# Patient Record
Sex: MALE | Race: WHITE | NOT HISPANIC OR LATINO | Employment: OTHER | URBAN - METROPOLITAN AREA
[De-identification: names, ages, dates, MRNs, and addresses within clinical notes are randomized per-mention and may not be internally consistent; named-entity substitution may affect disease eponyms.]

---

## 2023-04-20 ENCOUNTER — APPOINTMENT (OUTPATIENT)
Dept: PRIMARY CARE | Facility: CLINIC | Age: 68
End: 2023-04-20
Payer: MEDICARE

## 2023-04-23 DIAGNOSIS — E78.2 MIXED HYPERLIPIDEMIA: Primary | ICD-10-CM

## 2023-04-24 RX ORDER — SIMVASTATIN 20 MG/1
TABLET, FILM COATED ORAL
Qty: 90 TABLET | Refills: 0 | Status: SHIPPED | OUTPATIENT
Start: 2023-04-24 | End: 2023-07-17

## 2023-06-06 PROBLEM — R06.83 SNORING: Status: ACTIVE | Noted: 2023-06-06

## 2023-06-06 PROBLEM — R26.81 UNSTEADY GAIT: Status: ACTIVE | Noted: 2023-06-06

## 2023-06-06 PROBLEM — F43.21 SITUATIONAL DEPRESSION: Status: ACTIVE | Noted: 2023-06-06

## 2023-06-06 PROBLEM — N20.0 CALCIUM OXALATE STONES: Status: ACTIVE | Noted: 2023-06-06

## 2023-06-06 PROBLEM — H60.509 ACUTE OTITIS EXTERNA: Status: ACTIVE | Noted: 2023-06-06

## 2023-06-06 PROBLEM — J98.8 RESPIRATORY TRACT INFECTION DUE TO COVID-19 VIRUS: Status: ACTIVE | Noted: 2023-06-06

## 2023-06-06 PROBLEM — E66.3 OVERWEIGHT (BMI 25.0-29.9): Status: ACTIVE | Noted: 2023-06-06

## 2023-06-06 PROBLEM — H61.22 IMPACTED CERUMEN OF LEFT EAR: Status: ACTIVE | Noted: 2023-06-06

## 2023-06-06 PROBLEM — L21.9 SEBORRHEA: Status: ACTIVE | Noted: 2023-06-06

## 2023-06-06 PROBLEM — H90.3 ASYMMETRICAL SENSORINEURAL HEARING LOSS: Status: ACTIVE | Noted: 2023-06-06

## 2023-06-06 PROBLEM — G89.29 CHRONIC PAIN OF LEFT KNEE: Status: ACTIVE | Noted: 2023-06-06

## 2023-06-06 PROBLEM — D12.6 ADENOMATOUS POLYP OF COLON: Status: ACTIVE | Noted: 2023-06-06

## 2023-06-06 PROBLEM — I69.128: Status: ACTIVE | Noted: 2023-06-06

## 2023-06-06 PROBLEM — M62.838 MUSCLE SPASMS OF NECK: Status: ACTIVE | Noted: 2023-06-06

## 2023-06-06 PROBLEM — M72.2 PLANTAR FASCIITIS: Status: ACTIVE | Noted: 2023-06-06

## 2023-06-06 PROBLEM — E78.5 DYSLIPIDEMIA: Status: ACTIVE | Noted: 2023-06-06

## 2023-06-06 PROBLEM — I44.4 LAFB (LEFT ANTERIOR FASCICULAR BLOCK): Status: ACTIVE | Noted: 2023-06-06

## 2023-06-06 PROBLEM — H93.12 TINNITUS OF LEFT EAR: Status: ACTIVE | Noted: 2023-06-06

## 2023-06-06 PROBLEM — R03.0 BLOOD PRESSURE ELEVATED WITHOUT HISTORY OF HTN: Status: ACTIVE | Noted: 2023-06-06

## 2023-06-06 PROBLEM — N40.0 BENIGN ENLARGEMENT OF PROSTATE: Status: ACTIVE | Noted: 2023-06-06

## 2023-06-06 PROBLEM — L40.9 PSORIASIS OF SCALP: Status: ACTIVE | Noted: 2023-06-06

## 2023-06-06 PROBLEM — J31.0 RHINITIS: Status: ACTIVE | Noted: 2023-06-06

## 2023-06-06 PROBLEM — Z99.89 AMBULATES WITH CANE: Status: ACTIVE | Noted: 2023-06-06

## 2023-06-06 PROBLEM — M25.562 CHRONIC PAIN OF LEFT KNEE: Status: ACTIVE | Noted: 2023-06-06

## 2023-06-06 PROBLEM — N28.9 KIDNEY LESION: Status: ACTIVE | Noted: 2023-06-06

## 2023-06-06 PROBLEM — H60.12 CELLULITIS OF LEFT EAR: Status: ACTIVE | Noted: 2023-06-06

## 2023-06-06 PROBLEM — M47.812 CERVICAL SPINE ARTHRITIS: Status: ACTIVE | Noted: 2023-06-06

## 2023-06-06 PROBLEM — Z97.3 WEARS GLASSES: Status: ACTIVE | Noted: 2023-06-06

## 2023-06-06 PROBLEM — U07.1 RESPIRATORY TRACT INFECTION DUE TO COVID-19 VIRUS: Status: ACTIVE | Noted: 2023-06-06

## 2023-06-06 PROBLEM — F41.8 SITUATIONAL ANXIETY: Status: ACTIVE | Noted: 2023-06-06

## 2023-06-06 PROBLEM — E78.5 HYPERLIPIDEMIA: Status: ACTIVE | Noted: 2023-06-06

## 2023-06-06 PROBLEM — N48.1 BALANITIS: Status: ACTIVE | Noted: 2023-06-06

## 2023-06-06 PROBLEM — R94.31 LEFT AXIS DEVIATION: Status: ACTIVE | Noted: 2023-06-06

## 2023-06-06 RX ORDER — FLUTICASONE PROPIONATE 50 MCG
SPRAY, SUSPENSION (ML) NASAL
COMMUNITY
Start: 2018-12-21 | End: 2023-06-15 | Stop reason: ALTCHOICE

## 2023-06-06 RX ORDER — ESCITALOPRAM OXALATE 10 MG/1
1 TABLET ORAL DAILY
COMMUNITY
End: 2023-06-15 | Stop reason: ALTCHOICE

## 2023-06-06 RX ORDER — BROMFENAC SODIUM 0.7 MG/ML
SOLUTION/ DROPS OPHTHALMIC
COMMUNITY
Start: 2022-10-10 | End: 2023-06-15 | Stop reason: ALTCHOICE

## 2023-06-06 RX ORDER — MUPIROCIN 20 MG/G
OINTMENT TOPICAL
COMMUNITY
Start: 2019-05-10 | End: 2023-06-15 | Stop reason: ALTCHOICE

## 2023-06-06 RX ORDER — CLOBETASOL PROPIONATE 0.46 MG/ML
SOLUTION TOPICAL
COMMUNITY
Start: 2022-09-08

## 2023-06-06 RX ORDER — CLOBETASOL PROPIONATE 0.5 MG/G
CREAM TOPICAL
COMMUNITY
Start: 2022-09-08

## 2023-06-06 RX ORDER — ESCITALOPRAM OXALATE 5 MG/1
1 TABLET ORAL DAILY
COMMUNITY
Start: 2019-03-28 | End: 2023-06-15 | Stop reason: ALTCHOICE

## 2023-06-06 RX ORDER — PREDNISOLONE ACETATE 10 MG/ML
SUSPENSION/ DROPS OPHTHALMIC
COMMUNITY
Start: 2022-10-10 | End: 2023-06-15 | Stop reason: ALTCHOICE

## 2023-06-06 RX ORDER — AMLODIPINE BESYLATE 2.5 MG/1
TABLET ORAL
COMMUNITY
Start: 2023-02-18 | End: 2023-10-03

## 2023-06-15 ENCOUNTER — OFFICE VISIT (OUTPATIENT)
Dept: PRIMARY CARE | Facility: CLINIC | Age: 68
End: 2023-06-15
Payer: MEDICARE

## 2023-06-15 VITALS
WEIGHT: 202 LBS | HEART RATE: 47 BPM | HEIGHT: 73 IN | OXYGEN SATURATION: 97 % | SYSTOLIC BLOOD PRESSURE: 124 MMHG | RESPIRATION RATE: 16 BRPM | TEMPERATURE: 97.2 F | DIASTOLIC BLOOD PRESSURE: 78 MMHG | BODY MASS INDEX: 26.77 KG/M2

## 2023-06-15 DIAGNOSIS — I69.128 SPEECH AND LANGUAGE DEFICIT AS LATE EFFECT OF INTRACEREBRAL HEMORRHAGE: ICD-10-CM

## 2023-06-15 DIAGNOSIS — E66.3 OVERWEIGHT (BMI 25.0-29.9): ICD-10-CM

## 2023-06-15 DIAGNOSIS — R35.1 BENIGN PROSTATIC HYPERPLASIA WITH NOCTURIA: ICD-10-CM

## 2023-06-15 DIAGNOSIS — R22.1 NECK NODULE: ICD-10-CM

## 2023-06-15 DIAGNOSIS — N40.1 BENIGN PROSTATIC HYPERPLASIA WITH NOCTURIA: ICD-10-CM

## 2023-06-15 DIAGNOSIS — E78.5 DYSLIPIDEMIA, GOAL LDL BELOW 100: ICD-10-CM

## 2023-06-15 DIAGNOSIS — D12.6 ADENOMATOUS POLYP OF COLON, UNSPECIFIED PART OF COLON: ICD-10-CM

## 2023-06-15 DIAGNOSIS — L40.9 PSORIASIS OF SCALP: ICD-10-CM

## 2023-06-15 DIAGNOSIS — R09.89 PULSE IRREGULARITY: ICD-10-CM

## 2023-06-15 DIAGNOSIS — R03.0 BLOOD PRESSURE ELEVATED WITHOUT HISTORY OF HTN: ICD-10-CM

## 2023-06-15 DIAGNOSIS — Z71.85 IMMUNIZATION COUNSELING: Primary | ICD-10-CM

## 2023-06-15 PROBLEM — M72.2 PLANTAR FASCIITIS: Status: RESOLVED | Noted: 2023-06-06 | Resolved: 2023-06-15

## 2023-06-15 PROBLEM — U07.1 RESPIRATORY TRACT INFECTION DUE TO COVID-19 VIRUS: Status: RESOLVED | Noted: 2023-06-06 | Resolved: 2023-06-15

## 2023-06-15 PROBLEM — J98.8 RESPIRATORY TRACT INFECTION DUE TO COVID-19 VIRUS: Status: RESOLVED | Noted: 2023-06-06 | Resolved: 2023-06-15

## 2023-06-15 PROBLEM — N28.9 KIDNEY LESION: Status: RESOLVED | Noted: 2023-06-06 | Resolved: 2023-06-15

## 2023-06-15 PROBLEM — M62.838 MUSCLE SPASMS OF NECK: Status: RESOLVED | Noted: 2023-06-06 | Resolved: 2023-06-15

## 2023-06-15 PROCEDURE — 1159F MED LIST DOCD IN RCRD: CPT | Performed by: INTERNAL MEDICINE

## 2023-06-15 PROCEDURE — 93000 ELECTROCARDIOGRAM COMPLETE: CPT | Performed by: INTERNAL MEDICINE

## 2023-06-15 PROCEDURE — 99214 OFFICE O/P EST MOD 30 MIN: CPT | Performed by: INTERNAL MEDICINE

## 2023-06-15 PROCEDURE — 1036F TOBACCO NON-USER: CPT | Performed by: INTERNAL MEDICINE

## 2023-06-15 PROCEDURE — 1160F RVW MEDS BY RX/DR IN RCRD: CPT | Performed by: INTERNAL MEDICINE

## 2023-06-15 RX ORDER — KETOTIFEN FUMARATE 0.35 MG/ML
1 SOLUTION/ DROPS OPHTHALMIC 2 TIMES DAILY
COMMUNITY

## 2023-06-15 ASSESSMENT — LIFESTYLE VARIABLES: HOW OFTEN DO YOU HAVE A DRINK CONTAINING ALCOHOL: 2-3 TIMES A WEEK

## 2023-06-15 ASSESSMENT — PATIENT HEALTH QUESTIONNAIRE - PHQ9
SUM OF ALL RESPONSES TO PHQ9 QUESTIONS 1 AND 2: 0
2. FEELING DOWN, DEPRESSED OR HOPELESS: NOT AT ALL
1. LITTLE INTEREST OR PLEASURE IN DOING THINGS: NOT AT ALL

## 2023-06-15 NOTE — PROGRESS NOTES
"Subjective   Patient ID: Ernesto Anaya is a 68 y.o. male who presents for Follow-up (5 month follow up//Pt colonoscopy got rescheduled, he will have it done August).    Here for follow-up.  He feels well without illnesses or injuries.  His left cataract was done and he is pleased with the outcome.  His right cataract is on hold.    No exertional chest pain, palpitations, dizziness, orthopnea or pedal edema.    Still with balance issues, from his brain surgery 4 years ago.  It has not improved though it has not worsened.  No falls.         Review of Systems    Objective   /78   Pulse (!) 47   Temp 36.2 °C (97.2 °F)   Resp 16   Ht 1.854 m (6' 1\")   Wt 91.6 kg (202 lb)   SpO2 97%   BMI 26.65 kg/m²     Physical Exam  Constitutional:       Appearance: Normal appearance.   HENT:      Head: Normocephalic and atraumatic.      Right Ear: Tympanic membrane normal.      Nose: Nose normal.   Eyes:      General: No scleral icterus.     Extraocular Movements: Extraocular movements intact.      Conjunctiva/sclera: Conjunctivae normal.      Pupils: Pupils are equal, round, and reactive to light.   Cardiovascular:      Rate and Rhythm: Normal rate and regular rhythm.      Pulses: Normal pulses.      Heart sounds: Normal heart sounds. No murmur heard.  Pulmonary:      Effort: Pulmonary effort is normal. No respiratory distress.      Breath sounds: Normal breath sounds. No stridor. No wheezing.   Abdominal:      General: Abdomen is flat. Bowel sounds are normal. There is no distension.      Palpations: Abdomen is soft. There is no mass.      Tenderness: There is no abdominal tenderness. There is no guarding.   Musculoskeletal:         General: No swelling, tenderness or deformity. Normal range of motion.      Cervical back: Normal range of motion and neck supple. No tenderness.   Lymphadenopathy:      Cervical: No cervical adenopathy.   Skin:     General: Skin is warm and dry.      Findings: No lesion or rash. "   Neurological:      General: No focal deficit present.      Mental Status: He is alert and oriented to person, place, and time.      Cranial Nerves: No cranial nerve deficit.      Motor: No weakness.   Psychiatric:         Mood and Affect: Mood normal.         Behavior: Behavior normal.         Thought Content: Thought content normal.         Judgment: Judgment normal.         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    Bradycardia-no dizziness.  Unremarkable EKG and rhythm strip 6/23.  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 No headaches no fevers no nausea. Rehabilitation efforts continued for some time.. He saw Dr. Alejo in June '19. No scheduled f/u planned unless symptoms recur.            Doing remarkably well now over 4 years out. Main residual deficit continues to be his imbalance. He continues extreme caution walking especially on uneven ground such as the grass or beach     Swallowing issues- coughing while eating. He has never had a swallowing test completed s/p his craniotomes. He will f/u with radiology for barium  "swallow.             Eating \"fine\" now. He will continue caution swallowing with eating as well. He opted not to do the swallowing test                Borderline elevated blood pressure-improved on recheck. He has a home machine-he will bring it in to meet with our pharmacy team in 2 to 3 weeks to check the accuracy and follow this understanding ideally his blood pressure should be consistently under 130.               Tolerating low-dose amlodipine.  Blood pressure acceptable today.      Dyslipidemia / elevated weight - he'll cont. statin.  Goal LDL under 100.  Lipids rev'd. Encouraged more exercise and weight loss efforts.  Annual lipids ordered     Exercise routine-he's now walking outside.      Gait unsteadiness / falls -multifactorial since his brain surgery-legs stiffen w/ prolonged sitting, and imbalance continues;  He has balance exercises - encouraged him to do daily, and caution on stairs - one hand always on the banister, and caution walking, and limited beer- which worsens his gait        Situational depression/emotional much improved with Lexapro. He will continue. He is looking forward to the holidays, and week in [place] in late January   Overall he is doing well-- With Lexapro 5 mg daily     Rhinitis- more of an issue this winter-since returning home from surgery- Flonase suggested in the past. Encouraged with springtime approaching. Refilled.     Right shoulder stiffness - noted some mornings. Tylenol occas used. encouraged continue daily stretches     Cervical spasms/advanced C-spine arthritis noted on imaging-suspect underlying DJD. 2. Handout provided for him to do twice daily after heating pad for 20-30 minutes in the past. Still occas sore. He will continue to work with therapy     Left knee-occasionally sore. He will continue nonweightbearing. Caution with the stairs. Minimal DJD on x-rays late 2019     Plantar fasciitis- improved with better shoes. Encouraged stretching     Arthralgias- " encouraged heat and stretch daily and follow-up with any persistent symptoms        BPH/urology care/history of nephrolithiasis/renal lesion-he will see Dr. Mazariegos each November for hx of calcium oxalate stones. PSA 11/19 normal. Renal ultrasound completed November 2019 with Dr. Mazariegos. He will follow-up within this summer           PSA Normal November 2022.     Colon polyps - Colonoscopy care- will continue colonoscopy updated 12/19. Next in 3 years- 12/22- ordered.             Spring colonoscopy postponed- rescheduled for Aug '23        Left tinnitus- a bit better with cerumen removal. ENT appointment completed January 2019 with Dr. Frankel. Left ear still with a bit less hearing. Will consider hearing test at some point down the road     Left ear cellulitis- topical mupirocin suggested last time. improved.     Scalp psoriasis / Seborrhea/skin care-he will follow-up with his dermatologist. meds not that helpful. Dr. Aldridge / [facility] who just ordered clobetasol spray.          He follows w/ him annually-right knee patch and scalp  stable     Orthodontic care-he has finished his final implant following his braces and restorative work. He will continue dental care accordingly with his dentist. unfortunately had another implant in early 2018. No new concerns. No new dental concerns     Vision care / cataracts -mainly depth perception affecting his walking-he will use caution accordingly and follow up with ophthalmology at [facility]. He will follow up with ophthalmology accordingly.   Cataract concerns and vision an issue; encouraged him to set up eye appt at [facility]. Anticipates left cataract this year            Left cataract surgery completed early 12/22. Improvement in visual acuity. He is pleased with the results. Anticipates right cataract at some point down the road.    Tdap-will need updated in 2023. ordered        Flu shot provided each fall- updated 11/22     Shingrix series updated  2020     Covid series completed. Booster completed. Additional booster shot updated 11/22     Pneumovax updated- 6/22     Follow-up 6 months, sooner with concerns     Charting was completed using voice recognition technology and may include unintended errors.

## 2023-08-16 PROBLEM — R05.8 RECURRENT COUGH: Chronic | Status: ACTIVE | Noted: 2023-08-16

## 2023-08-16 PROBLEM — I10 ESSENTIAL HYPERTENSION WITH GOAL BLOOD PRESSURE LESS THAN 130/85: Status: ACTIVE | Noted: 2023-08-16

## 2023-08-16 PROBLEM — E78.5 HYPERLIPIDEMIA: Status: ACTIVE | Noted: 2023-08-16

## 2023-08-16 PROBLEM — M72.2 PLANTAR FASCIITIS: Status: ACTIVE | Noted: 2023-08-16

## 2023-08-16 PROBLEM — R79.89 TSH ELEVATION: Status: ACTIVE | Noted: 2023-08-16

## 2023-08-16 PROBLEM — L72.3 SEBACEOUS CYST: Status: ACTIVE | Noted: 2023-08-16

## 2023-08-16 RX ORDER — ASPIRIN 81 MG/1
81 TABLET ORAL DAILY
COMMUNITY
End: 2023-12-19 | Stop reason: WASHOUT

## 2023-08-16 RX ORDER — CALCIPOTRIENE, BETAMETHASONE DIPROPIONATE 50; .643 UG/G; MG/G
OINTMENT TOPICAL AS NEEDED
COMMUNITY

## 2023-08-16 RX ORDER — GLUCOSAM/CHONDRO/HERB 149/HYAL 750-100 MG
1 TABLET ORAL
COMMUNITY
End: 2023-12-19 | Stop reason: WASHOUT

## 2023-08-17 ENCOUNTER — HOSPITAL ENCOUNTER (OUTPATIENT)
Dept: DATA CONVERSION | Facility: HOSPITAL | Age: 68
End: 2023-08-17
Attending: COLON & RECTAL SURGERY | Admitting: COLON & RECTAL SURGERY
Payer: MEDICARE

## 2023-08-17 DIAGNOSIS — K63.5 POLYP OF COLON: ICD-10-CM

## 2023-08-17 DIAGNOSIS — K57.30 DIVERTICULOSIS OF LARGE INTESTINE WITHOUT PERFORATION OR ABSCESS WITHOUT BLEEDING: ICD-10-CM

## 2023-08-17 DIAGNOSIS — Z12.11 ENCOUNTER FOR SCREENING FOR MALIGNANT NEOPLASM OF COLON: ICD-10-CM

## 2023-08-17 DIAGNOSIS — D12.3 BENIGN NEOPLASM OF TRANSVERSE COLON: ICD-10-CM

## 2023-08-17 DIAGNOSIS — K62.1 RECTAL POLYP: ICD-10-CM

## 2023-08-17 DIAGNOSIS — Z86.010 PERSONAL HISTORY OF COLONIC POLYPS: ICD-10-CM

## 2023-08-25 LAB
COMPLETE PATHOLOGY REPORT: NORMAL
CONVERTED CLINICAL DIAGNOSIS-HISTORY: NORMAL
CONVERTED FINAL DIAGNOSIS: NORMAL
CONVERTED FINAL REPORT PDF LINK TO COPY AND PASTE: NORMAL
CONVERTED GROSS DESCRIPTION: NORMAL

## 2023-09-18 LAB
ALANINE AMINOTRANSFERASE (SGPT) (U/L) IN SER/PLAS: 23 U/L (ref 10–52)
ALBUMIN (G/DL) IN SER/PLAS: 4.3 G/DL (ref 3.4–5)
ALKALINE PHOSPHATASE (U/L) IN SER/PLAS: 67 U/L (ref 33–136)
ANION GAP IN SER/PLAS: 11 MMOL/L (ref 10–20)
ASPARTATE AMINOTRANSFERASE (SGOT) (U/L) IN SER/PLAS: 20 U/L (ref 9–39)
BASOPHILS (10*3/UL) IN BLOOD BY AUTOMATED COUNT: 0.04 X10E9/L (ref 0–0.1)
BASOPHILS/100 LEUKOCYTES IN BLOOD BY AUTOMATED COUNT: 0.8 % (ref 0–2)
BILIRUBIN TOTAL (MG/DL) IN SER/PLAS: 0.5 MG/DL (ref 0–1.2)
CALCIUM (MG/DL) IN SER/PLAS: 9.5 MG/DL (ref 8.6–10.3)
CARBON DIOXIDE, TOTAL (MMOL/L) IN SER/PLAS: 28 MMOL/L (ref 21–32)
CHLORIDE (MMOL/L) IN SER/PLAS: 105 MMOL/L (ref 98–107)
CREATININE (MG/DL) IN SER/PLAS: 1.12 MG/DL (ref 0.5–1.3)
EOSINOPHILS (10*3/UL) IN BLOOD BY AUTOMATED COUNT: 0.05 X10E9/L (ref 0–0.7)
EOSINOPHILS/100 LEUKOCYTES IN BLOOD BY AUTOMATED COUNT: 1 % (ref 0–6)
ERYTHROCYTE DISTRIBUTION WIDTH (RATIO) BY AUTOMATED COUNT: 14 % (ref 11.5–14.5)
ERYTHROCYTE MEAN CORPUSCULAR HEMOGLOBIN CONCENTRATION (G/DL) BY AUTOMATED: 32.8 G/DL (ref 32–36)
ERYTHROCYTE MEAN CORPUSCULAR VOLUME (FL) BY AUTOMATED COUNT: 89 FL (ref 80–100)
ERYTHROCYTES (10*6/UL) IN BLOOD BY AUTOMATED COUNT: 5.26 X10E12/L (ref 4.5–5.9)
GFR MALE: 71 ML/MIN/1.73M2
GLUCOSE (MG/DL) IN SER/PLAS: 98 MG/DL (ref 74–99)
HEMATOCRIT (%) IN BLOOD BY AUTOMATED COUNT: 46.9 % (ref 41–52)
HEMOGLOBIN (G/DL) IN BLOOD: 15.4 G/DL (ref 13.5–17.5)
IMMATURE GRANULOCYTES/100 LEUKOCYTES IN BLOOD BY AUTOMATED COUNT: 0.4 % (ref 0–0.9)
LEUKOCYTES (10*3/UL) IN BLOOD BY AUTOMATED COUNT: 5 X10E9/L (ref 4.4–11.3)
LYMPHOCYTES (10*3/UL) IN BLOOD BY AUTOMATED COUNT: 1.41 X10E9/L (ref 1.2–4.8)
LYMPHOCYTES/100 LEUKOCYTES IN BLOOD BY AUTOMATED COUNT: 28.3 % (ref 13–44)
MONOCYTES (10*3/UL) IN BLOOD BY AUTOMATED COUNT: 0.61 X10E9/L (ref 0.1–1)
MONOCYTES/100 LEUKOCYTES IN BLOOD BY AUTOMATED COUNT: 12.2 % (ref 2–10)
NEUTROPHILS (10*3/UL) IN BLOOD BY AUTOMATED COUNT: 2.86 X10E9/L (ref 1.2–7.7)
NEUTROPHILS/100 LEUKOCYTES IN BLOOD BY AUTOMATED COUNT: 57.3 % (ref 40–80)
NRBC (PER 100 WBCS) BY AUTOMATED COUNT: 0 /100 WBC (ref 0–0)
PLATELETS (10*3/UL) IN BLOOD AUTOMATED COUNT: 151 X10E9/L (ref 150–450)
POTASSIUM (MMOL/L) IN SER/PLAS: 4.4 MMOL/L (ref 3.5–5.3)
PROTEIN TOTAL: 7.3 G/DL (ref 6.4–8.2)
SODIUM (MMOL/L) IN SER/PLAS: 140 MMOL/L (ref 136–145)
UREA NITROGEN (MG/DL) IN SER/PLAS: 20 MG/DL (ref 6–23)

## 2023-09-27 ENCOUNTER — HOSPITAL ENCOUNTER (OUTPATIENT)
Dept: DATA CONVERSION | Facility: HOSPITAL | Age: 68
Discharge: HOME | End: 2023-09-27
Attending: SURGERY | Admitting: SURGERY
Payer: MEDICARE

## 2023-09-27 DIAGNOSIS — L72.3 SEBACEOUS CYST: ICD-10-CM

## 2023-09-29 VITALS
HEART RATE: 74 BPM | TEMPERATURE: 97.3 F | HEIGHT: 73 IN | DIASTOLIC BLOOD PRESSURE: 88 MMHG | WEIGHT: 193.56 LBS | RESPIRATION RATE: 16 BRPM | SYSTOLIC BLOOD PRESSURE: 149 MMHG | BODY MASS INDEX: 25.65 KG/M2

## 2023-09-30 NOTE — H&P
History & Physical Reviewed:   I have reviewed the History and Physical dated:  26-Sep-2023   History and Physical reviewed and relevant findings noted. Patient examined to review pertinent physical  findings.: No significant changes   Home Medications Reviewed: no changes noted   Allergies Reviewed: no changes noted       ERAS (Enhanced Recovery After Surgery):  ·  ERAS Patient: no     Consent:   COVID-19 Consent:  ·  COVID-19 Risk Consent Surgeon has reviewed key risks related to the risk of kassandra COVID-19 and if they contract COVID-19 what the risks are.       Electronic Signatures:  Pete Silva)  (Signed 27-Sep-2023 06:52)   Authored: History & Physical Reviewed, ERAS, Consent,  Note Completion      Last Updated: 27-Sep-2023 06:52 by Pete Silva)

## 2023-09-30 NOTE — H&P
History of Present Illness:   History Present Illness:  Reason for surgery: history of colon polyp   HPI:    67 y/o M with history of colon polyps here today for colonoscopy    Allergies:        Allergies:  ·  No Known Allergies :     Home Medication Review:   Home Medications Reviewed: yes     Impression/Procedure:   ·  Impression and Planned Procedure: colonoscopy       ERAS (Enhanced Recovery After Surgery):  ·  ERAS Patient: no       Vital Signs:  Temperature C: 36.3 degrees C   Temperature F: 97.3 degrees F   Heart Rate: 74 beats per minute   Respiratory Rate: 16 breath per minute   Blood Pressure Systolic: 149 mm/Hg   Blood Pressure Diastolic: 88 mm/Hg     Physical Exam by System:    Respiratory/Thorax: nonlabored breathing on room  air   Cardiovascular: regular rate and rhythm     Consent:   COVID-19 Consent:  ·  COVID-19 Risk Consent Surgeon has reviewed key risks related to the risk of kassandra COVID-19 and if they contract COVID-19 what the risks are.     Attestation:   Note Completion:  I am a:  Resident/Fellow   Attending Attestation I saw and evaluated the patient.  I personally obtained the key and critical portions of the history and physical exam or was physically present for key and  critical portions performed by the resident/fellow. I reviewed the resident/fellow?s documentation and discussed the patient with the resident/fellow.  I agree with the resident/fellow?s medical decision making as documented in the note.     I personally evaluated the patient on 17-Aug-2023         Electronic Signatures:  Doyle Pierce)  (Signed 18-Aug-2023 10:33)   Authored: Physical Exam, Note Completion   Co-Signer: History of Present Illness, Allergies, Home Medication Review, Impression/Procedure, ERAS, Physical Exam, Consent, Note Completion  Minh Horner (Resident))  (Signed 17-Aug-2023 11:15)   Authored: History of Present Illness, Allergies, Home  Medication Review, Impression/Procedure,  ERAS, Physical Exam, Consent, Note Completion      Last Updated: 18-Aug-2023 10:33 by Doyle Pierce)

## 2023-09-30 NOTE — H&P
History of Present Illness:   HPI:    KACIE YEE is a 68 year old Male who presents for elective excision of an infected sebaceous cyst at the posterior base of the neck        Past medical history/    Hypertension  Elevated lipids  Kidney stones    Status post craniotomy for benign disease    Psoriasis          Social/    Non-smoker        Family/    Noncontributory    Comorbidities:   Comorbidites:  ·  Comorbid Conditions hypertension            Allergies:  ·  No Known Allergies :     Medications Prior to Admission:   Admission Medication Reconciliation has not been completed for this patient.    Review of Systems:   Constitutional: NEGATIVE: Fever, Chills, Anorexia,  Weight Loss, Malaise     Eyes: NEGATIVE: Blurry Vision, Drainage, Diploplia,  Redness, Vision Loss/ Change     ENMT: NEGATIVE: Nasal Discharge, Nasal Congestion,  Ear Pain, Mouth Pain, Throat Pain     Respiratory: NEGATIVE: Dry Cough, Productive Cough,  Hemoptysis, Wheezing, Shortness of Breath     Cardiac: NEGATIVE: Chest Pain, Dyspnea on Exertion,  Orthopnea, Palpitations, Syncope     Gastrointestinal: NEGATIVE: Nausea, Vomiting, Diarrhea,  Constipation, Abdominal Pain     Genitourinary: NEGATIVE: Discharge, Dysuria, Flank  Pain, Frequency, Hematuria     Musculoskeletal: NEGATIVE: Decreased ROM, Pain, Swelling,  Stiffness, Weakness     Neurological: NEGATIVE: Dizziness, Confusion, Headache,  Seizures, Syncope     Psychiatric: NEGATIVE: Mood Changes, Anxiety, Hallucinations,  Sleep Changes, Suicidal Ideas     Skin: NEGATIVE: Mass, Pain, Pruritus, Rash, Ulcer       Objective:   Physical Exam by System:    Constitutional: Well developed, awake/alert/oriented  x3, no distress, alert and cooperative   Eyes: PERRL, EOMI, clear sclera   ENMT: mucous membranes moist, no apparent injury,  no lesions seen   Head/Neck: Neck supple, no apparent injury, thyroid  without mass or tenderness, No JVD, trachea midline, no bruits   Respiratory/Thorax: Patent  airways, CTAB, normal  breath sounds with good chest expansion, thorax symmetric   Cardiovascular: Regular, rate and rhythm, no murmurs,  2+ equal pulses of the extremities, normal S 1and S 2   Gastrointestinal: Nondistended, soft, non-tender,  no rebound tenderness or guarding, no masses palpable, no organomegaly, +BS, no bruits   Extremities: normal extremities, no cyanosis edema,  contusions or wounds, no clubbing   Neurological: alert and oriented x3, intact senses,  motor, response and reflexes, normal strength   Skin: Examination of the posterior midline at the  base of the neck notes a 3 cm soft tissue mass.  It is soft and nontender.  It is not erythematous.  It has a punctum seen in the central area     Assessment and Plan:   Assessment:    Assessment/    Sebaceous cyst at base of neck      Hypertension  Elevated lipids  Psoriasis  Kidney stones  Status post craniotomy for benign disease          Plan/    This to be excised in the operating room under general anesthesia.    Risk benefits indications for this reviewed with the patient.  The anticipated convalescence is reviewed.  Potential infections reviewed.  All questions were answered and consent is obtained      Electronic Signatures:  Pete Silva)  (Signed 26-Sep-2023 11:39)   Authored: History of Present Illness, Comorbidities,  Allergies, Medications Prior to Admission, Review of Systems, Objective, Assessment and Plan, Note Completion      Last Updated: 26-Sep-2023 11:39 by Pete Silva)

## 2023-10-01 NOTE — OP NOTE
Post Operative Note:     PreOp Diagnosis: Sebaceous cyst at base of neck   Post-Procedure Diagnosis: Same   Procedure: 1.  Excision of sebaceous cyst  2.  Intermediate closure of wound  3.   4.   5.   Surgeon: Ricardo   Resident/Fellow/Other Assistant:    Anesthesia: General   Estimated Blood Loss (mL): Minimal   Specimen: yes   Findings: 5.5 cm mass, 8.0 cm closure     Operative Report Dictated:  Dictation: not applicable - note contains Operative  Report   Operative Report:    Patient is a 68-year-old male with a intermittently infected sebaceous cyst at the base of the neck.  He presents now for excision.  The risk benefits indications  for this were reviewed with him.  All questions were answered and consent was obtained.    Patient was taken to the operating room.  General esthesia was obtained.  He was placed on the table in prone position with the neck flexed forward.  Posterior neck was not prepped and draped in a sterile fashion.  Timeout procedures were followed    Lesion been marked consistent with the Jako standards preoperatively    Elliptical incision was made around the lesion and dissection carried out lesion was elevated and dissected free using cautery and sharp dissection.  Again a large sebaceous cyst was removed.  Was removed from the field.    Was made hemostatic and closed with multiple layers with 3-0 Vicryl.  Skin was closed with interrupted 3-0 nylon    Size the specimen was 55 mm.  Length of the closed wound was 80 mm.    Specimen sent to pathology.    Patient tolerated the procedure well      Electronic Signatures:  Pete Silva)  (Signed 27-Sep-2023 08:08)   Authored: Post Operative Note, Note Completion      Last Updated: 27-Sep-2023 08:08 by Pete Silva)

## 2023-10-03 DIAGNOSIS — I10 ESSENTIAL HYPERTENSION WITH GOAL BLOOD PRESSURE LESS THAN 130/85: Primary | ICD-10-CM

## 2023-10-03 DIAGNOSIS — E78.2 MIXED HYPERLIPIDEMIA: ICD-10-CM

## 2023-10-03 RX ORDER — SIMVASTATIN 20 MG/1
20 TABLET, FILM COATED ORAL EVERY EVENING
Qty: 90 TABLET | Refills: 3 | Status: SHIPPED | OUTPATIENT
Start: 2023-10-03 | End: 2023-12-19

## 2023-10-03 RX ORDER — AMLODIPINE BESYLATE 2.5 MG/1
2.5 TABLET ORAL DAILY
Qty: 90 TABLET | Refills: 3 | Status: SHIPPED | OUTPATIENT
Start: 2023-10-03

## 2023-10-10 ENCOUNTER — OFFICE VISIT (OUTPATIENT)
Dept: SURGERY | Facility: CLINIC | Age: 68
End: 2023-10-10
Payer: MEDICARE

## 2023-10-10 VITALS — HEART RATE: 53 BPM | TEMPERATURE: 97.6 F | SYSTOLIC BLOOD PRESSURE: 137 MMHG | DIASTOLIC BLOOD PRESSURE: 82 MMHG

## 2023-10-10 DIAGNOSIS — L72.3 SEBACEOUS CYST: Primary | ICD-10-CM

## 2023-10-10 PROCEDURE — 1160F RVW MEDS BY RX/DR IN RCRD: CPT | Performed by: SURGERY

## 2023-10-10 PROCEDURE — 3079F DIAST BP 80-89 MM HG: CPT | Performed by: SURGERY

## 2023-10-10 PROCEDURE — 1159F MED LIST DOCD IN RCRD: CPT | Performed by: SURGERY

## 2023-10-10 PROCEDURE — 1036F TOBACCO NON-USER: CPT | Performed by: SURGERY

## 2023-10-10 PROCEDURE — 99024 POSTOP FOLLOW-UP VISIT: CPT | Performed by: SURGERY

## 2023-10-10 PROCEDURE — 3075F SYST BP GE 130 - 139MM HG: CPT | Performed by: SURGERY

## 2023-10-10 NOTE — PROGRESS NOTES
Ernesto Anaya   [id number]   1955     Chief Complaint/        Postop        HPI/    Status post excision of a large cyst at the base of the neck    Patient ambulating easily.  Minimal pain.  States there is little bit of drainage        Final pathology demonstrates a large sebaceous cyst     Electrocardiogram 12 Lead  Sinus rhythm with frequent Premature ventricular complexes  Left anterior fascicular block  Cannot rule out Anterior infarct , age undetermined  ST & T wave abnormality, consider lateral ischemia  Abnormal ECG  When compared with ECG of 10-MARKO-2019 10:15,  Premature ventricular complexes are now Present  Nonspecific T wave abnormality no longer evident in Inferior leads  T wave inversion now evident in Lateral leads  Confirmed by Yanique Delcid ([id number]) on 9/21/2023 4:06:18 PM       /82 (BP Location: Right arm, Patient Position: Sitting, BP Cuff Size: Adult)   Pulse 53   Temp 36.4 °C (97.6 °F) (Temporal)      Physical Exam  Skin:     Comments: Posterior neck wound is little puffy.  No erythema.    The scabbing at the left lateral edge is noted.  Scab was unroofed and a seroma was evacuated.  This is left open and packed with gauze                Assessment/      Seroma    Plan/      This will need to be left open.  Change packing twice daily.    RTC 1 week

## 2023-10-17 ENCOUNTER — OFFICE VISIT (OUTPATIENT)
Dept: SURGERY | Facility: CLINIC | Age: 68
End: 2023-10-17
Payer: MEDICARE

## 2023-10-17 VITALS
OXYGEN SATURATION: 96 % | WEIGHT: 199.2 LBS | BODY MASS INDEX: 26.4 KG/M2 | DIASTOLIC BLOOD PRESSURE: 81 MMHG | SYSTOLIC BLOOD PRESSURE: 122 MMHG | HEIGHT: 73 IN | HEART RATE: 55 BPM | RESPIRATION RATE: 16 BRPM | TEMPERATURE: 98.2 F

## 2023-10-17 DIAGNOSIS — L72.3 SEBACEOUS CYST: Primary | ICD-10-CM

## 2023-10-17 PROCEDURE — 99212 OFFICE O/P EST SF 10 MIN: CPT | Performed by: SURGERY

## 2023-10-17 PROCEDURE — 1160F RVW MEDS BY RX/DR IN RCRD: CPT | Performed by: SURGERY

## 2023-10-17 PROCEDURE — 1036F TOBACCO NON-USER: CPT | Performed by: SURGERY

## 2023-10-17 PROCEDURE — 3079F DIAST BP 80-89 MM HG: CPT | Performed by: SURGERY

## 2023-10-17 PROCEDURE — 3074F SYST BP LT 130 MM HG: CPT | Performed by: SURGERY

## 2023-10-17 PROCEDURE — 1159F MED LIST DOCD IN RCRD: CPT | Performed by: SURGERY

## 2023-10-17 NOTE — PROGRESS NOTES
Chief complaint/    Wound check        HPI/    Status post excision of a large sebaceous cyst.  The wound developed a seroma and this was opened.  The patient states the wound is open even more        Physical exam/    The wound is open.  There are some granulation tissue is basically clean    No peripheral erythema or induration        Assessment/wound infection        Plan/    Complete the course oral antibiotics    Twice daily dressing changes    Wound will take several weeks to heal    RTC 2-3 weeks

## 2023-11-07 ENCOUNTER — APPOINTMENT (OUTPATIENT)
Dept: SURGERY | Facility: CLINIC | Age: 68
End: 2023-11-07
Payer: MEDICARE

## 2023-11-21 ENCOUNTER — LAB (OUTPATIENT)
Dept: LAB | Facility: LAB | Age: 68
End: 2023-11-21
Payer: MEDICARE

## 2023-11-21 DIAGNOSIS — R35.1 BENIGN PROSTATIC HYPERPLASIA WITH NOCTURIA: ICD-10-CM

## 2023-11-21 DIAGNOSIS — N40.1 BENIGN PROSTATIC HYPERPLASIA WITH NOCTURIA: ICD-10-CM

## 2023-11-21 DIAGNOSIS — R03.0 BLOOD PRESSURE ELEVATED WITHOUT HISTORY OF HTN: ICD-10-CM

## 2023-11-21 DIAGNOSIS — E78.5 DYSLIPIDEMIA, GOAL LDL BELOW 100: ICD-10-CM

## 2023-11-21 LAB
ALBUMIN SERPL BCP-MCNC: 4.2 G/DL (ref 3.4–5)
ALP SERPL-CCNC: 77 U/L (ref 33–136)
ALT SERPL W P-5'-P-CCNC: 26 U/L (ref 10–52)
ANION GAP SERPL CALC-SCNC: 13 MMOL/L (ref 10–20)
AST SERPL W P-5'-P-CCNC: 23 U/L (ref 9–39)
BILIRUB SERPL-MCNC: 0.5 MG/DL (ref 0–1.2)
BUN SERPL-MCNC: 19 MG/DL (ref 6–23)
CALCIUM SERPL-MCNC: 9.4 MG/DL (ref 8.6–10.3)
CHLORIDE SERPL-SCNC: 103 MMOL/L (ref 98–107)
CHOLEST SERPL-MCNC: 190 MG/DL (ref 0–199)
CHOLESTEROL/HDL RATIO: 4.7
CO2 SERPL-SCNC: 29 MMOL/L (ref 21–32)
CREAT SERPL-MCNC: 1.12 MG/DL (ref 0.5–1.3)
ERYTHROCYTE [DISTWIDTH] IN BLOOD BY AUTOMATED COUNT: 13.9 % (ref 11.5–14.5)
GFR SERPL CREATININE-BSD FRML MDRD: 71 ML/MIN/1.73M*2
GLUCOSE SERPL-MCNC: 94 MG/DL (ref 74–99)
HCT VFR BLD AUTO: 45.3 % (ref 41–52)
HDLC SERPL-MCNC: 40.7 MG/DL
HGB BLD-MCNC: 15 G/DL (ref 13.5–17.5)
LDLC SERPL CALC-MCNC: 105 MG/DL
MCH RBC QN AUTO: 29.7 PG (ref 26–34)
MCHC RBC AUTO-ENTMCNC: 33.1 G/DL (ref 32–36)
MCV RBC AUTO: 90 FL (ref 80–100)
NON HDL CHOLESTEROL: 149 MG/DL (ref 0–149)
NRBC BLD-RTO: 0 /100 WBCS (ref 0–0)
PLATELET # BLD AUTO: 180 X10*3/UL (ref 150–450)
POTASSIUM SERPL-SCNC: 4.4 MMOL/L (ref 3.5–5.3)
PROT SERPL-MCNC: 7.2 G/DL (ref 6.4–8.2)
PSA SERPL-MCNC: 0.38 NG/ML
RBC # BLD AUTO: 5.05 X10*6/UL (ref 4.5–5.9)
SODIUM SERPL-SCNC: 141 MMOL/L (ref 136–145)
TRIGL SERPL-MCNC: 220 MG/DL (ref 0–149)
TSH SERPL-ACNC: 3.12 MIU/L (ref 0.44–3.98)
VLDL: 44 MG/DL (ref 0–40)
WBC # BLD AUTO: 4.7 X10*3/UL (ref 4.4–11.3)

## 2023-11-21 PROCEDURE — 80061 LIPID PANEL: CPT

## 2023-11-21 PROCEDURE — 85027 COMPLETE CBC AUTOMATED: CPT

## 2023-11-21 PROCEDURE — 80053 COMPREHEN METABOLIC PANEL: CPT

## 2023-11-21 PROCEDURE — 84153 ASSAY OF PSA TOTAL: CPT

## 2023-11-21 PROCEDURE — 84443 ASSAY THYROID STIM HORMONE: CPT

## 2023-11-21 PROCEDURE — 36415 COLL VENOUS BLD VENIPUNCTURE: CPT

## 2023-12-19 ENCOUNTER — OFFICE VISIT (OUTPATIENT)
Dept: PRIMARY CARE | Facility: CLINIC | Age: 68
End: 2023-12-19
Payer: MEDICARE

## 2023-12-19 VITALS
DIASTOLIC BLOOD PRESSURE: 78 MMHG | BODY MASS INDEX: 26.32 KG/M2 | OXYGEN SATURATION: 97 % | HEIGHT: 73 IN | SYSTOLIC BLOOD PRESSURE: 126 MMHG | TEMPERATURE: 97.3 F | RESPIRATION RATE: 16 BRPM | HEART RATE: 65 BPM | WEIGHT: 198.6 LBS

## 2023-12-19 DIAGNOSIS — Z23 NEED FOR INFLUENZA VACCINATION: ICD-10-CM

## 2023-12-19 DIAGNOSIS — Z00.00 ROUTINE GENERAL MEDICAL EXAMINATION AT HEALTH CARE FACILITY: Primary | ICD-10-CM

## 2023-12-19 DIAGNOSIS — D12.6 TUBULAR ADENOMA OF COLON: Chronic | ICD-10-CM

## 2023-12-19 DIAGNOSIS — Z23 IMMUNIZATION DUE: ICD-10-CM

## 2023-12-19 DIAGNOSIS — I10 ESSENTIAL HYPERTENSION WITH GOAL BLOOD PRESSURE LESS THAN 130/85: Chronic | ICD-10-CM

## 2023-12-19 DIAGNOSIS — E78.5 DYSLIPIDEMIA, GOAL LDL BELOW 100: Chronic | ICD-10-CM

## 2023-12-19 PROBLEM — R03.0 BLOOD PRESSURE ELEVATED WITHOUT HISTORY OF HTN: Status: RESOLVED | Noted: 2023-06-06 | Resolved: 2023-12-19

## 2023-12-19 PROCEDURE — 3078F DIAST BP <80 MM HG: CPT | Performed by: INTERNAL MEDICINE

## 2023-12-19 PROCEDURE — G0439 PPPS, SUBSEQ VISIT: HCPCS | Performed by: INTERNAL MEDICINE

## 2023-12-19 PROCEDURE — G0444 DEPRESSION SCREEN ANNUAL: HCPCS | Performed by: INTERNAL MEDICINE

## 2023-12-19 PROCEDURE — G0008 ADMIN INFLUENZA VIRUS VAC: HCPCS | Performed by: INTERNAL MEDICINE

## 2023-12-19 PROCEDURE — 1170F FXNL STATUS ASSESSED: CPT | Performed by: INTERNAL MEDICINE

## 2023-12-19 PROCEDURE — 3074F SYST BP LT 130 MM HG: CPT | Performed by: INTERNAL MEDICINE

## 2023-12-19 PROCEDURE — 99214 OFFICE O/P EST MOD 30 MIN: CPT | Performed by: INTERNAL MEDICINE

## 2023-12-19 PROCEDURE — 1036F TOBACCO NON-USER: CPT | Performed by: INTERNAL MEDICINE

## 2023-12-19 PROCEDURE — 1159F MED LIST DOCD IN RCRD: CPT | Performed by: INTERNAL MEDICINE

## 2023-12-19 PROCEDURE — 90662 IIV NO PRSV INCREASED AG IM: CPT | Performed by: INTERNAL MEDICINE

## 2023-12-19 PROCEDURE — 1160F RVW MEDS BY RX/DR IN RCRD: CPT | Performed by: INTERNAL MEDICINE

## 2023-12-19 RX ORDER — ATORVASTATIN CALCIUM 20 MG/1
20 TABLET, FILM COATED ORAL DAILY
Qty: 90 TABLET | Refills: 3 | Status: SHIPPED | OUTPATIENT
Start: 2023-12-19 | End: 2024-12-13

## 2023-12-19 ASSESSMENT — ACTIVITIES OF DAILY LIVING (ADL)
FEEDING YOURSELF: INDEPENDENT
GROOMING: INDEPENDENT
ASSISTIVE_DEVICE: EYEGLASSES
BATHING: INDEPENDENT
GROCERY_SHOPPING: INDEPENDENT
MANAGING_FINANCES: INDEPENDENT
DRESSING: INDEPENDENT
JUDGMENT_ADEQUATE_SAFELY_COMPLETE_DAILY_ACTIVITIES: YES
DOING_HOUSEWORK: INDEPENDENT
TAKING_MEDICATION: INDEPENDENT
ADEQUATE_TO_COMPLETE_ADL: YES
PATIENT'S MEMORY ADEQUATE TO SAFELY COMPLETE DAILY ACTIVITIES?: YES
TOILETING: INDEPENDENT

## 2023-12-19 ASSESSMENT — PATIENT HEALTH QUESTIONNAIRE - PHQ9
2. FEELING DOWN, DEPRESSED OR HOPELESS: NOT AT ALL
SUM OF ALL RESPONSES TO PHQ9 QUESTIONS 1 AND 2: 0
SUM OF ALL RESPONSES TO PHQ9 QUESTIONS 1 AND 2: 0
2. FEELING DOWN, DEPRESSED OR HOPELESS: NOT AT ALL
1. LITTLE INTEREST OR PLEASURE IN DOING THINGS: NOT AT ALL
1. LITTLE INTEREST OR PLEASURE IN DOING THINGS: NOT AT ALL

## 2023-12-19 ASSESSMENT — ENCOUNTER SYMPTOMS
LOSS OF SENSATION IN FEET: 0
DEPRESSION: 0
OCCASIONAL FEELINGS OF UNSTEADINESS: 0

## 2023-12-19 NOTE — PROGRESS NOTES
"Subjective   Reason for Visit: Ernesto Anaya is an 68 y.o. male here for a Medicare Wellness visit.     Past Medical, Surgical, and Family History reviewed and updated in chart.         Here for follow-up and wellness visit  Doing well-looking forward to going to [place] this winter as he has done in the past  Remains active though not really exercising regularly at the [facility] center  No exertional chest pain, palpitations, dizziness, orthopnea or pedal edema.        Patient Care Team:  Doc Hernandez MD as PCP - General  Doc Hernandez MD as PCP - [facility] Attributed Provider     Review of Systems    Objective   Vitals:  /78 (BP Location: Left arm, Patient Position: Sitting, BP Cuff Size: Adult)   Pulse 65   Temp 36.3 °C (97.3 °F)   Resp 16   Ht 1.854 m (6' 1\")   Wt 90.1 kg (198 lb 9.6 oz)   SpO2 97%   BMI 26.20 kg/m²       Physical Exam  Vitals reviewed.   Constitutional:       Appearance: Normal appearance.      Comments: Very pleasant gentleman talked slightly slower-his baseline   HENT:      Head: Normocephalic and atraumatic.   Eyes:      General: No scleral icterus.        Right eye: No discharge.         Left eye: No discharge.      Extraocular Movements: Extraocular movements intact.      Conjunctiva/sclera: Conjunctivae normal.      Pupils: Pupils are equal, round, and reactive to light.   Cardiovascular:      Rate and Rhythm: Normal rate and regular rhythm.      Pulses: Normal pulses.      Heart sounds: Normal heart sounds. No murmur heard.  Pulmonary:      Effort: Pulmonary effort is normal.      Breath sounds: Normal breath sounds. No wheezing or rhonchi.   Musculoskeletal:         General: No deformity or signs of injury. Normal range of motion.      Cervical back: Normal range of motion and neck supple. No rigidity or tenderness.   Lymphadenopathy:      Cervical: No cervical adenopathy.   Skin:     General: Skin is warm and dry.      Findings: No rash.   Neurological:      General: No focal " deficit present.      Mental Status: He is alert and oriented to person, place, and time. Mental status is at baseline.      Cranial Nerves: No cranial nerve deficit.      Sensory: No sensory deficit.      Gait: Gait normal.   Psychiatric:         Mood and Affect: Mood normal.         Behavior: Behavior normal.         Thought Content: Thought content normal.         Judgment: Judgment normal.         Assessment/Plan   Problem List Items Addressed This Visit       Tubular adenoma of colon    Essential hypertension with goal blood pressure less than 130/85    Dyslipidemia, goal LDL below 100    Relevant Medications    atorvastatin (Lipitor) 20 mg tablet    Other Relevant Orders    Lipid Panel    Alanine Aminotransferase    Immunization due     Other Visit Diagnoses       Routine general medical examination at health care facility    -  Primary    Relevant Orders    1 Year [facility] Up In [facility] - PCP - Wellness Exam    Need for influenza vaccination        Relevant Orders    Flu vaccine, quadrivalent, high-dose, preservative free, age 65y+ (FLUZONE) (Completed)             Irregular heart rhythm-noted on exam-EKG and rhythm strip showed sinus rhythm with bigeminy and trigeminy on the EKG.    Bradycardia-no dizziness.  Unremarkable EKG and rhythm strip 6/23.  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 No headaches no fevers no nausea. Rehabilitation efforts continued for some time.. He saw Dr. Alejo in June '19. No scheduled f/u planned unless symptoms recur.            Doing remarkably well now over 4 years out. Main residual deficit continues to be his imbalance. He continues extreme caution walking especially on uneven ground such as the grass or beach     Swallowing issues- coughing while eating. He has never had a swallowing test completed s/p his craniotomes. He will f/u with radiology for barium swallow.              "Eating \"fine\" now. He will continue caution swallowing with eating as well. He opted not to do the swallowing test                Borderline elevated blood pressure-improved on recheck. He has a home machine-he will bring it in to meet with our pharmacy team in 2 to 3 weeks to check the accuracy and follow this understanding ideally his blood pressure should be consistently under 130.               Tolerating low-dose amlodipine.  Blood pressure acceptable today.      Dyslipidemia / elevated weight - CT calcium score 8 in Sep '15.    Goal LDL under 100.  Tolerating statin.  Encouraged more exercise and weight loss efforts.              12/23  Recent  Nov '23. Weight down 4 lbs. BMI 26.2. He'll change from simvastatin 20 to atorvastatin 20     Exercise routine-he's now walking outside.             Encouraged walking, ideally 150 active exercise minutes weekly     Gait unsteadiness / falls -multifactorial since his brain surgery-legs stiffen w/ prolonged sitting, and imbalance continues;  He has balance exercises - encouraged him to do daily, and caution on stairs - one hand always on the banister, and caution walking, and limited beer- which worsens his gait        Situational depression/emotional much improved with Lexapro following his brain surgeries.  Overall he is doing well-- With Lexapro 5 mg daily            Lexapro has since been discontinued-he is doing fine without this.  Looking forward to [place] this winter, a trip he has done in the past     Rhinitis- more of an issue this winter-since returning home from surgery- Flonase suggested in the past. Encouraged with springtime approaching. Refilled.     Right shoulder stiffness - noted some mornings. Tylenol occas used. encouraged continue daily stretches     Cervical spasms/advanced C-spine arthritis noted on imaging-suspect underlying DJD. 2. Handout provided for him to do twice daily after heating pad for 20-30 minutes in the past. Still occas " sore. He will continue to work with therapy     Left knee-occasionally sore. He will continue nonweightbearing. Caution with the stairs. Minimal DJD on x-rays late 2019     Plantar fasciitis- improved with better shoes. Encouraged stretching     Arthralgias- encouraged heat and stretch daily and follow-up with any persistent symptoms        BPH/urology care/history of nephrolithiasis/renal lesion-he will see Dr. Mazariegos each November for hx of calcium oxalate stones. PSA 11/19 normal. Renal ultrasound completed November 2019 with Dr. Mazariegos. He will follow-up within this summer           PSA Normal November 2023. He notes he had flank pain in Aug '23, and pushed fluid. Wasn't able to strain urine, resolved spontaneously. He'll consider urology follow up if recurrence     Colon polyps - Colonoscopy care- will continue colonoscopy updated 12/19. Next in 3 years- 12/22- ordered.             Aug '23 - colonoscopy completed. Next in 5 yrs, Aug '28     Neck cyst - posterior lower cspine area - removed by Dr. Silva 10/23 inclusion cyst    Right postauricular nodule - to see Derm at [facility] [place] in Jan '24     Left tinnitus- a bit better with cerumen removal. ENT appointment completed January 2019 with Dr. Frankel. Left ear still with a bit less hearing. Will consider hearing test at some point down the road     Left ear cellulitis- topical mupirocin suggested last time. improved.     Scalp psoriasis / Seborrhea/skin care-he will follow-up with his dermatologist. meds not that helpful. Dr. Aldridge / [facility] who just ordered clobetasol spray.          He follows w/ him annually-right knee patch and scalp  stable     Orthodontic care-he has finished his final implant following his braces and restorative work. He will continue dental care accordingly with his dentist. unfortunately had another implant in early 2018. No new concerns. No new dental concerns     Vision care / cataracts -mainly depth perception affecting his  walking-he will use caution accordingly and follow up with ophthalmology at [place] [facility]. He will follow up with ophthalmology accordingly.   Cataract concerns and vision an issue; encouraged him to set up eye appt at [facility]. Anticipates left cataract this year            Left cataract surgery completed early 12/22. Improvement in visual acuity. He is pleased with the results. Anticipates right cataract at some point down the road. Mainly needs glasses at night    Tdap-will need updated in 2023. ordered        Flu shot provided each fall- updated 12/19/23 - today     Shingrix series updated 2020     Covid series completed. Booster completed. Additional booster shot updated 11/22; encouraged booster soon    RSV vacc - he'll do soon     Pneumovax updated- 6/22     Follow-up 6 months, sooner with concerns     Charting was completed using voice recognition technology and may include unintended errors.

## 2024-07-10 ENCOUNTER — LAB (OUTPATIENT)
Dept: LAB | Facility: LAB | Age: 69
End: 2024-07-10
Payer: MEDICARE

## 2024-07-10 DIAGNOSIS — E78.5 DYSLIPIDEMIA, GOAL LDL BELOW 100: Chronic | ICD-10-CM

## 2024-07-10 LAB
ALT SERPL W P-5'-P-CCNC: 27 U/L (ref 10–52)
CHOLEST SERPL-MCNC: 161 MG/DL (ref 0–199)
CHOLESTEROL/HDL RATIO: 4.4
HDLC SERPL-MCNC: 36.9 MG/DL
LDLC SERPL CALC-MCNC: 75 MG/DL
NON HDL CHOLESTEROL: 124 MG/DL (ref 0–149)
TRIGL SERPL-MCNC: 248 MG/DL (ref 0–149)
VLDL: 50 MG/DL (ref 0–40)

## 2024-07-10 PROCEDURE — 84460 ALANINE AMINO (ALT) (SGPT): CPT

## 2024-07-10 PROCEDURE — 80061 LIPID PANEL: CPT

## 2024-07-10 PROCEDURE — 36415 COLL VENOUS BLD VENIPUNCTURE: CPT

## 2024-07-16 ENCOUNTER — APPOINTMENT (OUTPATIENT)
Dept: PRIMARY CARE | Facility: CLINIC | Age: 69
End: 2024-07-16
Payer: MEDICARE

## 2024-07-16 VITALS
OXYGEN SATURATION: 94 % | HEART RATE: 90 BPM | TEMPERATURE: 97.6 F | HEIGHT: 73 IN | WEIGHT: 202.4 LBS | BODY MASS INDEX: 26.83 KG/M2 | DIASTOLIC BLOOD PRESSURE: 84 MMHG | SYSTOLIC BLOOD PRESSURE: 120 MMHG

## 2024-07-16 DIAGNOSIS — S81.811A LACERATION OF RIGHT LEG EXCLUDING THIGH, INITIAL ENCOUNTER: ICD-10-CM

## 2024-07-16 DIAGNOSIS — D12.6 TUBULAR ADENOMA OF COLON: ICD-10-CM

## 2024-07-16 DIAGNOSIS — E55.9 VITAMIN D DEFICIENCY: ICD-10-CM

## 2024-07-16 DIAGNOSIS — Z71.85 IMMUNIZATION COUNSELING: ICD-10-CM

## 2024-07-16 DIAGNOSIS — Z01.83 BLOOD TYPING ENCOUNTER: ICD-10-CM

## 2024-07-16 DIAGNOSIS — Z23 IMMUNIZATION DUE: ICD-10-CM

## 2024-07-16 DIAGNOSIS — N40.1 BENIGN PROSTATIC HYPERPLASIA WITH NOCTURIA: ICD-10-CM

## 2024-07-16 DIAGNOSIS — E78.5 DYSLIPIDEMIA, GOAL LDL BELOW 100: Chronic | ICD-10-CM

## 2024-07-16 DIAGNOSIS — R35.1 BENIGN PROSTATIC HYPERPLASIA WITH NOCTURIA: ICD-10-CM

## 2024-07-16 DIAGNOSIS — I10 ESSENTIAL HYPERTENSION WITH GOAL BLOOD PRESSURE LESS THAN 130/85: Primary | ICD-10-CM

## 2024-07-16 PROCEDURE — 90471 IMMUNIZATION ADMIN: CPT | Performed by: INTERNAL MEDICINE

## 2024-07-16 PROCEDURE — 3074F SYST BP LT 130 MM HG: CPT | Performed by: INTERNAL MEDICINE

## 2024-07-16 PROCEDURE — 1123F ACP DISCUSS/DSCN MKR DOCD: CPT | Performed by: INTERNAL MEDICINE

## 2024-07-16 PROCEDURE — 1036F TOBACCO NON-USER: CPT | Performed by: INTERNAL MEDICINE

## 2024-07-16 PROCEDURE — 1159F MED LIST DOCD IN RCRD: CPT | Performed by: INTERNAL MEDICINE

## 2024-07-16 PROCEDURE — 90715 TDAP VACCINE 7 YRS/> IM: CPT | Performed by: INTERNAL MEDICINE

## 2024-07-16 PROCEDURE — 1160F RVW MEDS BY RX/DR IN RCRD: CPT | Performed by: INTERNAL MEDICINE

## 2024-07-16 PROCEDURE — 3079F DIAST BP 80-89 MM HG: CPT | Performed by: INTERNAL MEDICINE

## 2024-07-16 PROCEDURE — 99214 OFFICE O/P EST MOD 30 MIN: CPT | Performed by: INTERNAL MEDICINE

## 2024-07-16 RX ORDER — AMLODIPINE BESYLATE 2.5 MG/1
2.5 TABLET ORAL DAILY
Qty: 90 TABLET | Refills: 3 | Status: SHIPPED | OUTPATIENT
Start: 2024-07-16

## 2024-07-16 RX ORDER — ATORVASTATIN CALCIUM 20 MG/1
20 TABLET, FILM COATED ORAL DAILY
Qty: 90 TABLET | Refills: 3 | Status: SHIPPED | OUTPATIENT
Start: 2024-07-16 | End: 2025-07-11
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
## 2024-12-11 ENCOUNTER — LAB (OUTPATIENT)
Dept: LAB | Facility: LAB | Age: 69
End: 2024-12-11
Payer: MEDICARE

## 2024-12-11 DIAGNOSIS — E78.5 DYSLIPIDEMIA, GOAL LDL BELOW 100: Chronic | ICD-10-CM

## 2024-12-11 DIAGNOSIS — I10 ESSENTIAL HYPERTENSION WITH GOAL BLOOD PRESSURE LESS THAN 130/85: ICD-10-CM

## 2024-12-11 DIAGNOSIS — Z01.83 BLOOD TYPING ENCOUNTER: ICD-10-CM

## 2024-12-11 DIAGNOSIS — R35.1 BENIGN PROSTATIC HYPERPLASIA WITH NOCTURIA: ICD-10-CM

## 2024-12-11 DIAGNOSIS — N40.1 BENIGN PROSTATIC HYPERPLASIA WITH NOCTURIA: ICD-10-CM

## 2024-12-11 DIAGNOSIS — E55.9 VITAMIN D DEFICIENCY: ICD-10-CM

## 2024-12-11 LAB
25(OH)D3 SERPL-MCNC: 22 NG/ML (ref 30–100)
ABO GROUP (TYPE) IN BLOOD: NORMAL
ALT SERPL W P-5'-P-CCNC: 39 U/L (ref 10–52)
ANION GAP SERPL CALC-SCNC: 14 MMOL/L (ref 10–20)
ANTIBODY SCREEN: NORMAL
BUN SERPL-MCNC: 15 MG/DL (ref 6–23)
CALCIUM SERPL-MCNC: 9.8 MG/DL (ref 8.6–10.6)
CHLORIDE SERPL-SCNC: 102 MMOL/L (ref 98–107)
CHOLEST SERPL-MCNC: 191 MG/DL (ref 0–199)
CHOLESTEROL/HDL RATIO: 4.9
CO2 SERPL-SCNC: 29 MMOL/L (ref 21–32)
CREAT SERPL-MCNC: 1.2 MG/DL (ref 0.5–1.3)
EGFRCR SERPLBLD CKD-EPI 2021: 65 ML/MIN/1.73M*2
GLUCOSE SERPL-MCNC: 108 MG/DL (ref 74–99)
HDLC SERPL-MCNC: 38.9 MG/DL
LDLC SERPL CALC-MCNC: 111 MG/DL
NON HDL CHOLESTEROL: 152 MG/DL (ref 0–149)
POTASSIUM SERPL-SCNC: 4.2 MMOL/L (ref 3.5–5.3)
PSA SERPL-MCNC: 1.35 NG/ML
RH FACTOR (ANTIGEN D): NORMAL
SODIUM SERPL-SCNC: 141 MMOL/L (ref 136–145)
TRIGL SERPL-MCNC: 207 MG/DL (ref 0–149)
TSH SERPL-ACNC: 2.97 MIU/L (ref 0.44–3.98)
VLDL: 41 MG/DL (ref 0–40)

## 2024-12-11 PROCEDURE — 86900 BLOOD TYPING SEROLOGIC ABO: CPT

## 2024-12-11 PROCEDURE — 84460 ALANINE AMINO (ALT) (SGPT): CPT

## 2024-12-11 PROCEDURE — 86901 BLOOD TYPING SEROLOGIC RH(D): CPT

## 2024-12-11 PROCEDURE — 36415 COLL VENOUS BLD VENIPUNCTURE: CPT

## 2024-12-11 PROCEDURE — 84443 ASSAY THYROID STIM HORMONE: CPT

## 2024-12-11 PROCEDURE — 84153 ASSAY OF PSA TOTAL: CPT

## 2024-12-11 PROCEDURE — 86850 RBC ANTIBODY SCREEN: CPT

## 2024-12-11 PROCEDURE — 82306 VITAMIN D 25 HYDROXY: CPT

## 2024-12-11 PROCEDURE — 80061 LIPID PANEL: CPT

## 2024-12-11 PROCEDURE — 80048 BASIC METABOLIC PNL TOTAL CA: CPT

## 2024-12-13 NOTE — RESULT ENCOUNTER NOTE
Results reviewed. No urgent findings.  Will Review results in detail at upcoming office appointment scheduled soon.      Doc Hernandez MD

## 2024-12-18 ENCOUNTER — APPOINTMENT (OUTPATIENT)
Dept: PRIMARY CARE | Facility: CLINIC | Age: 69
End: 2024-12-18
Payer: MEDICARE

## 2024-12-18 VITALS
BODY MASS INDEX: 27.22 KG/M2 | WEIGHT: 201 LBS | OXYGEN SATURATION: 97 % | HEART RATE: 69 BPM | SYSTOLIC BLOOD PRESSURE: 124 MMHG | DIASTOLIC BLOOD PRESSURE: 82 MMHG | HEIGHT: 72 IN

## 2024-12-18 DIAGNOSIS — I44.4 LAFB (LEFT ANTERIOR FASCICULAR BLOCK): ICD-10-CM

## 2024-12-18 DIAGNOSIS — J00 ACUTE RHINITIS: ICD-10-CM

## 2024-12-18 DIAGNOSIS — R73.03 PREDIABETES: ICD-10-CM

## 2024-12-18 DIAGNOSIS — N40.1 BENIGN PROSTATIC HYPERPLASIA WITH NOCTURIA: ICD-10-CM

## 2024-12-18 DIAGNOSIS — Z00.00 ROUTINE GENERAL MEDICAL EXAMINATION AT HEALTH CARE FACILITY: ICD-10-CM

## 2024-12-18 DIAGNOSIS — R35.1 BENIGN PROSTATIC HYPERPLASIA WITH NOCTURIA: ICD-10-CM

## 2024-12-18 DIAGNOSIS — E55.9 VITAMIN D DEFICIENCY: ICD-10-CM

## 2024-12-18 DIAGNOSIS — M47.812 CERVICAL SPINE ARTHRITIS: ICD-10-CM

## 2024-12-18 DIAGNOSIS — Z97.3 WEARS GLASSES: ICD-10-CM

## 2024-12-18 DIAGNOSIS — L40.9 PSORIASIS OF SCALP: ICD-10-CM

## 2024-12-18 DIAGNOSIS — I10 ESSENTIAL HYPERTENSION WITH GOAL BLOOD PRESSURE LESS THAN 130/85: Primary | ICD-10-CM

## 2024-12-18 DIAGNOSIS — E78.5 DYSLIPIDEMIA, GOAL LDL BELOW 100: ICD-10-CM

## 2024-12-18 DIAGNOSIS — D12.6 TUBULAR ADENOMA OF COLON: ICD-10-CM

## 2024-12-18 DIAGNOSIS — F41.8 SITUATIONAL ANXIETY: ICD-10-CM

## 2024-12-18 PROBLEM — H60.12 CELLULITIS OF LEFT EAR: Status: RESOLVED | Noted: 2023-06-06 | Resolved: 2024-12-18

## 2024-12-18 PROBLEM — H60.509 ACUTE OTITIS EXTERNA: Status: RESOLVED | Noted: 2023-06-06 | Resolved: 2024-12-18

## 2024-12-18 PROCEDURE — 3008F BODY MASS INDEX DOCD: CPT | Performed by: INTERNAL MEDICINE

## 2024-12-18 PROCEDURE — 1123F ACP DISCUSS/DSCN MKR DOCD: CPT | Performed by: INTERNAL MEDICINE

## 2024-12-18 PROCEDURE — 90662 IIV NO PRSV INCREASED AG IM: CPT | Performed by: INTERNAL MEDICINE

## 2024-12-18 PROCEDURE — 1036F TOBACCO NON-USER: CPT | Performed by: INTERNAL MEDICINE

## 2024-12-18 PROCEDURE — G0008 ADMIN INFLUENZA VIRUS VAC: HCPCS | Performed by: INTERNAL MEDICINE

## 2024-12-18 PROCEDURE — 1160F RVW MEDS BY RX/DR IN RCRD: CPT | Performed by: INTERNAL MEDICINE

## 2024-12-18 PROCEDURE — 3079F DIAST BP 80-89 MM HG: CPT | Performed by: INTERNAL MEDICINE

## 2024-12-18 PROCEDURE — 99214 OFFICE O/P EST MOD 30 MIN: CPT | Performed by: INTERNAL MEDICINE

## 2024-12-18 PROCEDURE — 3074F SYST BP LT 130 MM HG: CPT | Performed by: INTERNAL MEDICINE

## 2024-12-18 PROCEDURE — G0439 PPPS, SUBSEQ VISIT: HCPCS | Performed by: INTERNAL MEDICINE

## 2024-12-18 PROCEDURE — 93000 ELECTROCARDIOGRAM COMPLETE: CPT | Performed by: INTERNAL MEDICINE

## 2024-12-18 PROCEDURE — 1170F FXNL STATUS ASSESSED: CPT | Performed by: INTERNAL MEDICINE

## 2024-12-18 PROCEDURE — 1159F MED LIST DOCD IN RCRD: CPT | Performed by: INTERNAL MEDICINE

## 2024-12-18 RX ORDER — ERGOCALCIFEROL 1.25 MG/1
1.25 CAPSULE ORAL WEEKLY
Qty: 12 CAPSULE | Refills: 3 | Status: SHIPPED | OUTPATIENT
Start: 2024-12-18 | End: 2025-12-18

## 2024-12-18 RX ORDER — TAPINAROF 10 MG/1000MG
CREAM TOPICAL
COMMUNITY
Start: 2024-09-10

## 2024-12-18 RX ORDER — FLUTICASONE PROPIONATE 50 MCG
SPRAY, SUSPENSION (ML) NASAL
Qty: 16 G | Refills: 3 | Status: SHIPPED | OUTPATIENT
Start: 2024-12-18

## 2024-12-18 ASSESSMENT — ACTIVITIES OF DAILY LIVING (ADL)
GROOMING: INDEPENDENT
BATHING: INDEPENDENT
DRESSING: INDEPENDENT
TAKING_MEDICATION: INDEPENDENT
GROCERY_SHOPPING: INDEPENDENT
FEEDING YOURSELF: INDEPENDENT
ADEQUATE_TO_COMPLETE_ADL: YES
MANAGING_FINANCES: INDEPENDENT
PATIENT'S MEMORY ADEQUATE TO SAFELY COMPLETE DAILY ACTIVITIES?: YES
JUDGMENT_ADEQUATE_SAFELY_COMPLETE_DAILY_ACTIVITIES: YES
DOING_HOUSEWORK: INDEPENDENT
TOILETING: INDEPENDENT
ASSISTIVE_DEVICE: EYEGLASSES
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
## 2024-12-18 NOTE — ASSESSMENT & PLAN NOTE
Orders:    CT cardiac scoring wo IV contrast; Future    Lipid Panel; Future    Alanine Aminotransferase; Future

## 2024-12-18 NOTE — ASSESSMENT & PLAN NOTE
Orders:    fluticasone (Flonase) 50 mcg/actuation nasal spray; Use 2 sprays twice daily for 1 to 2 weeks, then once daily until congestion clears

## 2025-04-21 ENCOUNTER — HOSPITAL ENCOUNTER (OUTPATIENT)
Dept: RADIOLOGY | Facility: CLINIC | Age: 70
Discharge: HOME | End: 2025-04-21
Payer: MEDICARE

## 2025-04-21 DIAGNOSIS — E78.5 DYSLIPIDEMIA, GOAL LDL BELOW 100: ICD-10-CM

## 2025-04-21 DIAGNOSIS — I10 ESSENTIAL HYPERTENSION WITH GOAL BLOOD PRESSURE LESS THAN 130/85: ICD-10-CM

## 2025-04-25 ENCOUNTER — HOSPITAL ENCOUNTER (OUTPATIENT)
Dept: RADIOLOGY | Facility: CLINIC | Age: 70
End: 2025-04-25
Payer: MEDICARE

## 2025-04-28 ENCOUNTER — TELEPHONE (OUTPATIENT)
Dept: PRIMARY CARE | Facility: CLINIC | Age: 70
End: 2025-04-28
Payer: MEDICARE

## 2025-04-28 NOTE — TELEPHONE ENCOUNTER
Patient went in for CT cardiac score (2x)/patient was told at both appts that his heart rate was too high. They suggested a beta blocker? Please advise if patient should be seen in office or if a prescription could be called in on patient's behalf. OK to leave a message, if he doesn't answer his phone.

## 2025-04-30 DIAGNOSIS — I10 ESSENTIAL HYPERTENSION WITH GOAL BLOOD PRESSURE LESS THAN 130/85: Primary | ICD-10-CM

## 2025-04-30 RX ORDER — METOPROLOL TARTRATE 100 MG/1
TABLET ORAL
Qty: 2 TABLET | Refills: 0 | Status: SHIPPED | OUTPATIENT
Start: 2025-04-30

## 2025-04-30 NOTE — TELEPHONE ENCOUNTER
Left detailed message that rx was sent to pharmacy and advised to take medication 2 hours prior to testing, and also notified not to stand up too quickly. Asked pt to call back if he had any further questions.

## 2025-05-07 ENCOUNTER — HOSPITAL ENCOUNTER (OUTPATIENT)
Dept: RADIOLOGY | Facility: HOSPITAL | Age: 70
Discharge: HOME | End: 2025-05-07
Payer: MEDICARE

## 2025-05-07 PROCEDURE — 75571 CT HRT W/O DYE W/CA TEST: CPT

## 2025-05-10 DIAGNOSIS — E78.5 DYSLIPIDEMIA, GOAL LDL BELOW 100: Chronic | ICD-10-CM

## 2025-05-10 DIAGNOSIS — E78.5 DYSLIPIDEMIA, GOAL LDL BELOW 70: ICD-10-CM

## 2025-05-10 DIAGNOSIS — R93.1 AGATSTON CORONARY ARTERY CALCIUM SCORE BETWEEN 200 AND 399: Primary | ICD-10-CM

## 2025-05-10 RX ORDER — ATORVASTATIN CALCIUM 40 MG/1
40 TABLET, FILM COATED ORAL DAILY
Qty: 90 TABLET | Refills: 3 | Status: SHIPPED | OUTPATIENT
Start: 2025-05-10 | End: 2026-05-05

## 2025-05-10 RX ORDER — ASPIRIN 81 MG/1
81 TABLET ORAL DAILY
Qty: 90 TABLET | Refills: 3 | Status: SHIPPED | OUTPATIENT
Start: 2025-05-10 | End: 2026-05-10

## 2025-06-18 ENCOUNTER — OFFICE VISIT (OUTPATIENT)
Dept: CARDIOLOGY | Facility: CLINIC | Age: 70
End: 2025-06-18
Payer: MEDICARE

## 2025-06-18 VITALS
DIASTOLIC BLOOD PRESSURE: 83 MMHG | WEIGHT: 200 LBS | HEIGHT: 73 IN | HEART RATE: 109 BPM | BODY MASS INDEX: 26.51 KG/M2 | OXYGEN SATURATION: 96 % | SYSTOLIC BLOOD PRESSURE: 139 MMHG

## 2025-06-18 DIAGNOSIS — R93.1 AGATSTON CORONARY ARTERY CALCIUM SCORE BETWEEN 200 AND 399: ICD-10-CM

## 2025-06-18 DIAGNOSIS — I48.19 ATRIAL FIBRILLATION, PERSISTENT (MULTI): Primary | ICD-10-CM

## 2025-06-18 PROCEDURE — 3079F DIAST BP 80-89 MM HG: CPT | Performed by: INTERNAL MEDICINE

## 2025-06-18 PROCEDURE — 3008F BODY MASS INDEX DOCD: CPT | Performed by: INTERNAL MEDICINE

## 2025-06-18 PROCEDURE — 1123F ACP DISCUSS/DSCN MKR DOCD: CPT | Performed by: INTERNAL MEDICINE

## 2025-06-18 PROCEDURE — 99205 OFFICE O/P NEW HI 60 MIN: CPT | Performed by: INTERNAL MEDICINE

## 2025-06-18 PROCEDURE — 3075F SYST BP GE 130 - 139MM HG: CPT | Performed by: INTERNAL MEDICINE

## 2025-06-18 PROCEDURE — 99203 OFFICE O/P NEW LOW 30 MIN: CPT

## 2025-06-18 PROCEDURE — 93005 ELECTROCARDIOGRAM TRACING: CPT | Performed by: INTERNAL MEDICINE

## 2025-06-18 PROCEDURE — 1036F TOBACCO NON-USER: CPT | Performed by: INTERNAL MEDICINE

## 2025-06-18 PROCEDURE — 1159F MED LIST DOCD IN RCRD: CPT | Performed by: INTERNAL MEDICINE

## 2025-06-18 RX ORDER — METOPROLOL SUCCINATE 50 MG/1
50 TABLET, EXTENDED RELEASE ORAL DAILY
Qty: 30 TABLET | Refills: 11 | Status: SHIPPED | OUTPATIENT
Start: 2025-06-18 | End: 2026-06-18

## 2025-06-18 NOTE — PROGRESS NOTES
Name : Ernesto Anaya    : 1955   MRN : [id number]   ENC Date : 25     Reason for visit: Elevated coronary calcium score    Assessment and Plan:  Coronary artery calcification: Patient's coronary calcium score is only mildly elevated.  He is asymptomatic.  No need for stress testing.  Continue aspirin 81 mg daily and agree with atorvastatin 40 mg nightly.  Incidental finding of atrial fibrillation: Patient probably has been in atrial fibrillation a little bit longer than he is aware.  When he showed up for his coronary calcium score he was told his heart rate was elevated but an EKG was not done.  EKG today clearly shows atrial fibrillation.  We had a long discussion today regarding rate control versus rhythm control strategies.  It is too soon to pick 1 strategy or another just yet.  For the most part he is asymptomatic so a rate control strategy may be quite reasonable however he is also fairly young and trying to get him back into rhythm may also be reasonable.  It is highly likely that he will spontaneously convert back to sinus rhythm.  I recommended we add metoprolol succinate 50 mg daily for rate control.  There is a good chance this will convert him to sinus rhythm.  In addition he should be on oral anticoagulation.  Will start Eliquis 5 mg twice daily.  With his history of subdural hematoma he would be at increased risk for long-term bleeding complications given we do not exactly know how the subdural occurred.  It is not an absolute contraindication to starting Eliquis now but it probably is an indication for considering watchman implant down the road.  I encouraged him to purchase the ITT EXIMa mobile device as well.  Will get echocardiogram to assess LV function and rule out any valvular heart disease.  Disp: RTO after echocardiogram      HPI:  Patient is seen today for evaluation of elevated coronary calcium score.  He is asymptomatic with regard to this.  He denies any chest pain.  No syncopal  events.  No TIA or CVA-like symptoms.  Incidentally he was found to be in atrial fibrillation today.  This is a new diagnosis for him.  However in retrospect when he showed up for his coronary calcium score he was told that his heart rate was elevated and he could not have the scan done.  An EKG however was not done.  A heart rate was not documented.  He cannot tell that his heart is out of rhythm.  He very rarely will note that he feels his heart beating fast but this is only in retrospect.  In real time he really made no mention of it.  He denies any recent TIA or CVA-like symptoms.  He has a complicated history with subdural hematoma that is unclear how it occurred.  He underwent craniotomy for evacuation.  He said no recurrent events since then.  No TIA or CVA-like symptoms.  No syncopal events.  No claudication.      Problem List: Problem List[1]     Meds: Medications Ordered Prior to Encounter[2]    All: Allergies[3]    Fam Hx: Family History[4]    Soc Hx:   Social History     Socioeconomic History    Marital status:      Spouse name: Not on file    Number of children: Not on file    Years of education: Not on file    Highest education level: Not on file   Occupational History    Not on file   Tobacco Use    Smoking status: Never    Smokeless tobacco: Never   Vaping Use    Vaping status: Never Used   Substance and Sexual Activity    Alcohol use: Yes     Alcohol/week: 6.0 - 8.0 standard drinks of alcohol     Types: 6 - 8 Cans of beer per week    Drug use: Never    Sexual activity: Not on file   Other Topics Concern    Not on file   Social History Narrative    Not on file     Social Drivers of Health     Financial Resource Strain: Not on file   Food Insecurity: Not on file   Transportation Needs: Not on file   Physical Activity: Not on file   Stress: Not on file   Social Connections: Not on file   Intimate Partner Violence: Not on file   Housing Stability: Not on file       VS: /83 (BP Location:  "Right arm, Patient Position: Sitting)   Pulse 109   Ht 1.854 m (6' 1\")   Wt 90.7 kg (200 lb)   SpO2 96%   BMI 26.39 kg/m²      Physical Exam  Vitals reviewed.   Constitutional:       Appearance: Normal appearance.   Eyes:      Pupils: Pupils are equal, round, and reactive to light.   Neck:      Vascular: No JVD.   Cardiovascular:      Rate and Rhythm: Tachycardia present. Rhythm irregularly irregular.      Pulses: Normal pulses.      Heart sounds: No murmur heard.     No gallop.   Pulmonary:      Effort: No respiratory distress.      Breath sounds: No wheezing or rales.   Abdominal:      General: Abdomen is flat. There is no distension.      Palpations: Abdomen is soft.   Musculoskeletal:         General: No swelling.      Right lower leg: No edema.      Left lower leg: No edema.   Neurological:      General: No focal deficit present.      Mental Status: He is alert.   Psychiatric:         Mood and Affect: Mood normal.            Encounter Date: 24   ECG 12 Lead    Narrative    Sinus rhythm, left anterior fascicular block, stable EKG     EC.18.25: Atrial fibrillation with rapid ventricular response.  Nonspecific lateral T wave abnormalities.    CT calcium score: Images were independently reviewed by myself.  Agree with interpretation.    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 For blood pressure 90 tablet 3    aspirin 81 mg EC tablet Take 1 tablet (81 mg) by mouth once daily. To prevent a heart attack 90 tablet 3    atorvastatin (Lipitor) 40 mg tablet Take 1 tablet (40 mg) by mouth once daily. For cholesterol 90 tablet 3    ergocalciferol (Vitamin D2) 1.25 MG (52872 UT) capsule Take 1 capsule (1,250 mcg) by mouth 1 (one) time per week. For low vitamin D 12 capsule 3    fluticasone (Flonase) 50 mcg/actuation nasal spray Use 2 sprays twice daily for 1 to 2 weeks, then once daily until congestion clears 16 g 3    Vtama 1 % cream APPLY THIN LAYER TO AREAS ONCE DAILY      [DISCONTINUED] calcipotriene-betamethasone (Taclonex) ointment Apply topically if needed. (Patient not taking: Reported on 6/18/2025)      [DISCONTINUED] clobetasol (Temovate) 0.05 % cream Apply thin layer twice a day for up to two weeks, then take a week off may repeat as needed (Patient not taking: Reported on 6/18/2025)      [DISCONTINUED] clobetasol (Temovate) 0.05 % external solution Use in scalp daily as needed (Patient not taking: Reported on 6/18/2025)      [DISCONTINUED] ketotifen (Zaditor) 0.025 % (0.035 %) ophthalmic solution 1 drop 2 times a day. (Patient not taking: Reported on 6/18/2025)      [DISCONTINUED] metoprolol tartrate (Lopressor) 100 mg tablet Take 1 tablet orally 2 hours before the CT calcium score scan.  Watch for lightheadedness (Patient not taking: Reported on 6/18/2025) 2 tablet 0     No current facility-administered medications on file prior to visit.   [3]  No Known Allergies  [4]   Family History  Problem Relation Name Age of Onset    Stroke Mother          Mother  at 81 w/massive CVA    Heart disease Father          Father at 71 w/ Heart disease CABG, also W/carotis artery disease,  at 72    Other (foot surgery) Sister          lives in [place]    Other (cataract surgery) Sister      Other (bereavement) Sister          spouse  earlier in - pancreatic cancer    Other (bladder cancer) Brother           - brother [place], doing well    Valvular heart disease Brother          brother w/valve repair     Heart disease Other          Family History

## 2025-06-20 LAB
ATRIAL RATE: 153 BPM
Q ONSET: 212 MS
QRS COUNT: 18 BEATS
QRS DURATION: 100 MS
QT INTERVAL: 348 MS
QTC CALCULATION(BAZETT): 468 MS
QTC FREDERICIA: 424 MS
R AXIS: -56 DEGREES
T AXIS: 11 DEGREES
T OFFSET: 386 MS
VENTRICULAR RATE: 109 BPM

## 2025-07-10 ENCOUNTER — HOSPITAL ENCOUNTER (OUTPATIENT)
Dept: CARDIOLOGY | Facility: CLINIC | Age: 70
Discharge: HOME | End: 2025-07-10
Payer: MEDICARE

## 2025-07-10 DIAGNOSIS — I48.19 ATRIAL FIBRILLATION, PERSISTENT (MULTI): ICD-10-CM

## 2025-07-10 DIAGNOSIS — I48.91 UNSPECIFIED ATRIAL FIBRILLATION (MULTI): ICD-10-CM

## 2025-07-10 DIAGNOSIS — R93.1 AGATSTON CORONARY ARTERY CALCIUM SCORE BETWEEN 200 AND 399: ICD-10-CM

## 2025-07-10 LAB
AORTIC VALVE MEAN GRADIENT: 3 MMHG
AORTIC VALVE PEAK VELOCITY: 1.21 M/S
AV PEAK GRADIENT: 6 MMHG
AVA (PEAK VEL): 2.61 CM2
AVA (VTI): 2.74 CM2
EJECTION FRACTION APICAL 4 CHAMBER: 51.7
EJECTION FRACTION: 58 %
LEFT ATRIUM VOLUME AREA LENGTH INDEX BSA: 22.8 ML/M2
LEFT VENTRICLE INTERNAL DIMENSION DIASTOLE: 3.25 CM (ref 3.5–6)
LEFT VENTRICULAR OUTFLOW TRACT DIAMETER: 2.13 CM
RIGHT VENTRICLE FREE WALL PEAK S': 8 CM/S
TRICUSPID ANNULAR PLANE SYSTOLIC EXCURSION: 1.5 CM

## 2025-07-10 PROCEDURE — 93306 TTE W/DOPPLER COMPLETE: CPT

## 2025-07-10 PROCEDURE — 93306 TTE W/DOPPLER COMPLETE: CPT | Performed by: INTERNAL MEDICINE

## 2025-07-18 ENCOUNTER — OFFICE VISIT (OUTPATIENT)
Dept: CARDIOLOGY | Facility: CLINIC | Age: 70
End: 2025-07-18
Payer: MEDICARE

## 2025-07-18 VITALS
WEIGHT: 198 LBS | HEART RATE: 83 BPM | HEIGHT: 73 IN | DIASTOLIC BLOOD PRESSURE: 90 MMHG | SYSTOLIC BLOOD PRESSURE: 130 MMHG | OXYGEN SATURATION: 97 % | BODY MASS INDEX: 26.24 KG/M2

## 2025-07-18 DIAGNOSIS — I48.19 ATRIAL FIBRILLATION, PERSISTENT (MULTI): ICD-10-CM

## 2025-07-18 DIAGNOSIS — R93.1 AGATSTON CORONARY ARTERY CALCIUM SCORE BETWEEN 200 AND 399: ICD-10-CM

## 2025-07-18 PROCEDURE — 99212 OFFICE O/P EST SF 10 MIN: CPT

## 2025-07-18 PROCEDURE — 3008F BODY MASS INDEX DOCD: CPT | Performed by: INTERNAL MEDICINE

## 2025-07-18 PROCEDURE — 3075F SYST BP GE 130 - 139MM HG: CPT | Performed by: INTERNAL MEDICINE

## 2025-07-18 PROCEDURE — 3080F DIAST BP >= 90 MM HG: CPT | Performed by: INTERNAL MEDICINE

## 2025-07-18 PROCEDURE — 99214 OFFICE O/P EST MOD 30 MIN: CPT | Performed by: INTERNAL MEDICINE

## 2025-07-18 PROCEDURE — 1159F MED LIST DOCD IN RCRD: CPT | Performed by: INTERNAL MEDICINE

## 2025-07-18 PROCEDURE — 1036F TOBACCO NON-USER: CPT | Performed by: INTERNAL MEDICINE

## 2025-07-18 NOTE — PROGRESS NOTES
"Name : Ernesto Anaya   : 1955   MRN : [id number]   ENC Date : 2025      Assessment and Plan:  Persistent atrial fibrillation: Patient tolerating metoprolol and Eliquis well.  Long discussion regarding rate versus rhythm control strategy.  Given the significant mitral regurgitation seen he will be at higher risk for heart failure exacerbation in the future so trying to get him back into sinus rhythm sooner rather than later would likely decrease that risk somewhat.  Patient was agreeable.  We will schedule him for elective cardioversion sometime in the next couple of weeks.  He has been on Eliquis long enough that we do not need to do a transesophageal echocardiogram.  Mitral regurgitation: Moderately severe on transthoracic echo.  No indication for surgical or percutaneous intervention at this point given normal LVEF and relatively asymptomatic.  That said atrial fibrillation by itself can be an indication for mitral valve surgical intervention.  We will likely repeat echocardiogram once in sinus rhythm to reassess the degree of MR.  Coronary artery calcification: No angina.  No need for stress test.  Continue statin therapy.  Disp: Determine follow-up after cardioversion    HPI:  Patient returns today to review the results of his echocardiogram and how he is tolerating his medications.  He has no side effects from the Eliquis or metoprolol.  He only rarely feels palpitations.  Occasional shortness of breath with activity but no orthopnea nor PND.  No chest pain.  We reviewed the results of his echocardiogram in detail and had a long discussion regarding rate versus rhythm control strategies again for atrial fibrillation.    Problem list overview: Problem List[1]    Meds: Medications Ordered Prior to Encounter[2]     VS:  /90 (BP Location: Left arm, Patient Position: Sitting)   Pulse 83   Ht 1.854 m (6' 1\")   Wt 89.8 kg (198 lb)   SpO2 97%   BMI 26.12 kg/m²     Vitals reviewed.   Neck:      " Vascular: No JVD.   Pulmonary:      Breath sounds: Normal breath sounds.   Cardiovascular:      Normal rate. Irregularly irregular rhythm.      Murmurs: There is a grade 2/6 systolic murmur at the apex.      No gallop.    Edema:     Peripheral edema absent.   Abdominal:      General: Abdomen is flat.      Palpations: Abdomen is soft.   Skin:     General: Skin is warm.        ECHO: Results for orders placed during the hospital encounter of 07/10/25    Transthoracic echo (TTE) complete    Narrative  [facility], [address] [place] [address]  Tel [phone] and Fax [phone]    TRANSTHORACIC ECHOCARDIOGRAM REPORT      Patient Name:       KACIE YEE       Reading Physician:    [id number] Luna MD  Study Date:         7/10/2025           Ordering Provider:    [id number] AXEL LUNA  MRN/PID:            [id number]            Fellow:  Accession#:         [id number]        Nurse:  Date of Birth/Age:  1955 / 70      Sonographer:          Freya montenegro                                     RDCS  Gender assigned at  M                   Additional Staff:  Birth:  Height:             185.42 cm           Admit Date:  Weight:             90.72 kg            Admission Status:     Outpatient  BSA / BMI:          2.15 m2 / 26.39     Encounter#:           [id number]  kg/m2  Blood Pressure:     139/83 mmHg         Department Location:  [facility]    Study Type:    TRANSTHORACIC ECHO (TTE) COMPLETE  Diagnosis/ICD: Unspecified atrial fibrillation-I48.91  Indication:    afib  CPT Code:      Echo Complete w Full Doppler-[id number]    Patient History:  Pertinent History: A-Fib.    Study Detail: The following Echo studies were performed: 2D, M-Mode, Doppler and  color flow.      PHYSICIAN INTERPRETATION:  Left Ventricle: The left ventricular systolic function is normal with a visually estimated ejection fraction of 55-60%. The patient is in atrial fibrillation/flutter which may influence the estimate  of left ventricular function and transvalvular flows. There is moderate concentric left ventricular hypertrophy. There are no regional left ventricular wall motion abnormalities. The left ventricular cavity size is normal. There is moderately increased septal and mildly increased posterior left ventricular wall thickness. There is a false tendon visualized in the left ventricle. There is left ventricular concentric remodeling. Left ventricular diastolic filling is indeterminate due to atrial fibrillation/flutter.  Left Atrium: The left atrial size is mild to moderately dilated.  Right Ventricle: The right ventricle is normal in size. There is normal right ventricular global systolic function.  Right Atrium: The right atrium is normal in size.  Aortic Valve: The aortic valve is trileaflet. The aortic valve area by VTI is 2.74 cmï¿½ with a peak velocity of 1.21 m/s. The peak and mean gradients are 5 mmHg and 3 mmHg, respectively with a dimensionless index of 0.77. There is no evidence of aortic valve regurgitation.  Mitral Valve: The mitral valve is mildly thickened. There is moderate to severe mitral valve regurgitation. The E Vmax is 1.00 m/s. The mitral regurgitant orifice area is 12 mm2. The mitral regurgitant volume is 18.57 ml.  Tricuspid Valve: The tricuspid valve is structurally normal. No evidence of tricuspid regurgitation.  Pulmonic Valve: The pulmonic valve is structurally normal. There is trace to mild pulmonic valve regurgitation.  Pericardium: There is no pericardial effusion noted.  Aorta: The aortic root is abnormal. There is mild dilatation of the ascending aorta. There is mild dilatation of the aortic root.  In comparison to the previous echocardiogram(s): There are no prior studies on this patient for comparison purposes.      CONCLUSIONS:  1. The left ventricular systolic function is normal with a visually estimated ejection fraction of 55-60%.  2. Left ventricular diastolic filling is  indeterminate due to atrial fibrillation/flutter.  3. There is moderately increased septal thickness.  4. There is moderate concentric left ventricular hypertrophy.  5. There is normal right ventricular global systolic function.  6. The left atrial size is mild to moderately dilated.  7. Moderate to severe mitral valve regurgitation.  8. Mild aortic root enlargment, 4.2cm.  9. The patient is in atrial fibrillation/flutter which may influence the estimate of left ventricular function and transvalvular flows.    QUANTITATIVE DATA SUMMARY:    2D MEASUREMENTS:          Normal Ranges:  Ao Root d:       4.30 cm  (2.0-3.7cm)  LAs:             4.45 cm  (2.7-4.0cm)  IVSd:            1.58 cm  (0.6-1.1cm)  LVPWd:           1.27 cm  (0.6-1.1cm)  LVIDd:           3.25 cm  (3.9-5.9cm)  LVIDs:           2.44 cm  LV Mass Index:   75 g/m2  LVEDV Index:     48 ml/m2  LV % FS          24.9 %      LEFT ATRIUM:                  Normal Ranges:  LA Vol A4C:        47.8 ml    (22+/-6mL/m2)  LA Vol A2C:        49.6 ml  LA Vol BP:         49.1 ml  LA Vol Index A4C:  22.2ml/m2  LA Vol Index A2C:  23.1 ml/m2  LA Vol Index BP:   22.8 ml/m2  LA Area A4C:       17.9 cm2  LA Area A2C:       18.4 cm2  LA Major Axis A4C: 5.7 cm  LA Major Axis A2C: 5.8 cm  LA Vol A4C:        46.1 ml  LA Vol A2C:        48.7 ml  LA Vol Index BSA:  22.0 ml/m2      RIGHT ATRIUM:          Normal Ranges:  RA Area A4C:  16.3 cm2      AORTA MEASUREMENTS:         Normal Ranges:  Asc Ao, d:          4.20 cm (2.1-3.4cm)      LV SYSTOLIC FUNCTION:  Normal Ranges:  EF-A4C View:    52 % (>=55%)  EF-A2C View:    50 %  EF-Biplane:     51 %  EF-Visual:      58 %  LV EF Reported: 58 %      LV DIASTOLIC FUNCTION:           Normal Ranges:  MV Peak E:             1.00 m/s  (0.7-1.2 m/s)  MV e'                  0.085 m/s (>8.0)  MV lateral e'          0.10 m/s  MV medial e'           0.08 m/s  E/e' Ratio:            11.73     (<8.0)      MITRAL INSUFFICIENCY:             Normal  Ranges:  MR VTI:               148.90 cm  MR Vmax:              532.35 cm/s  MR Volume:            18.57 ml  MR Flow Rt:           66.37 ml/s  MR EROA:              12 mm2      AORTIC VALVE:                     Normal Ranges:  AoV Vmax:                1.21 m/s (<=1.7m/s)  AoV Peak P.8 mmHg (<20mmHg)  AoV Mean PG:             3.1 mmHg (1.7-11.5mmHg)  LVOT Max Stefano:            0.88 m/s (<=1.1m/s)  AoV VTI:                 18.50 cm (18-25cm)  LVOT VTI:                14.23 cm  LVOT Diameter:           2.13 cm  (1.8-2.4cm)  AoV Area, VTI:           2.74 cm2 (2.5-5.5cm2)  AoV Area,Vmax:           2.61 cm2 (2.5-4.5cm2)  AoV Dimensionless Index: 0.77      RIGHT VENTRICLE:  RV Basal 3.50 cm  RV Mid   2.80 cm  RV Major 7.5 cm  TAPSE:   15.0 mm  RV s'    0.08 m/s      TRICUSPID VALVE/RVSP:         Normal Ranges:  Est. RA Pressure:     3  IVC Diam:             1.85 cm      PULMONIC VALVE:          Normal Ranges:  PV Accel Time:  84 msec  (>120ms)  PV Max Stefano:     1.1 m/s  (0.6-0.9m/s)  PV Max P.5 mmHg      AORTA:  Asc Ao Diam 4.20 cm      [id number] Vince Britton MD  Electronically signed on 7/10/2025 at 2:52:58 PM        ** Final **     CT Results:  No results found for this or any previous visit from the past 365 days.      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 For blood pressure 90 tablet 3    apixaban (Eliquis) 5 mg tablet Take 1 tablet (5 mg) by mouth 2 times a day. 60 tablet 11    aspirin 81 mg EC tablet Take 1 tablet (81 mg) by mouth once daily. To prevent a heart attack 90 tablet 3    atorvastatin (Lipitor) 40 mg tablet Take 1 tablet (40 mg) by mouth once daily. For cholesterol 90 tablet 3    ergocalciferol (Vitamin D2) 1.25 MG (68926 UT) capsule Take 1 capsule (1,250 mcg) by mouth 1 (one) time per week. For low vitamin D 12 capsule 3    metoprolol succinate XL (Toprol-XL) 50 mg 24 hr tablet Take 1 tablet (50 mg) by mouth once daily. Do not crush or chew. 30 tablet 11    Vtama 1 % cream APPLY THIN LAYER TO AREAS ONCE DAILY      fluticasone (Flonase) 50 mcg/actuation nasal spray Use 2 sprays twice daily for 1 to 2 weeks, then once daily until congestion clears (Patient not taking: Reported on 7/18/2025) 16 g 3     No current facility-administered medications on file prior to visit.

## 2025-07-19 LAB
25(OH)D3+25(OH)D2 SERPL-MCNC: 46 NG/ML (ref 30–100)
ALT SERPL-CCNC: 29 U/L (ref 9–46)
ANION GAP SERPL CALCULATED.4IONS-SCNC: 11 MMOL/L (CALC) (ref 7–17)
BUN SERPL-MCNC: 16 MG/DL (ref 7–25)
BUN/CREAT SERPL: NORMAL (CALC) (ref 6–22)
CALCIUM SERPL-MCNC: 9.8 MG/DL (ref 8.6–10.3)
CHLORIDE SERPL-SCNC: 102 MMOL/L (ref 98–110)
CHOLEST SERPL-MCNC: 148 MG/DL
CHOLEST/HDLC SERPL: 3.8 (CALC)
CO2 SERPL-SCNC: 27 MMOL/L (ref 20–32)
CREAT SERPL-MCNC: 1.1 MG/DL (ref 0.7–1.28)
EGFRCR SERPLBLD CKD-EPI 2021: 72 ML/MIN/1.73M2
EST. AVERAGE GLUCOSE BLD GHB EST-MCNC: 120 MG/DL
EST. AVERAGE GLUCOSE BLD GHB EST-SCNC: 6.6 MMOL/L
GLUCOSE SERPL-MCNC: 98 MG/DL (ref 65–99)
HBA1C MFR BLD: 5.8 %
HDLC SERPL-MCNC: 39 MG/DL
LDLC SERPL CALC-MCNC: 78 MG/DL (CALC)
NONHDLC SERPL-MCNC: 109 MG/DL (CALC)
POTASSIUM SERPL-SCNC: 4.6 MMOL/L (ref 3.5–5.3)
PSA SERPL-MCNC: 0.85 NG/ML
SODIUM SERPL-SCNC: 140 MMOL/L (ref 135–146)
TRIGL SERPL-MCNC: 214 MG/DL

## 2025-07-20 DIAGNOSIS — I10 ESSENTIAL HYPERTENSION WITH GOAL BLOOD PRESSURE LESS THAN 130/85: ICD-10-CM

## 2025-07-20 RX ORDER — AMLODIPINE BESYLATE 2.5 MG/1
TABLET ORAL
Qty: 90 TABLET | Refills: 3 | Status: SHIPPED | OUTPATIENT
Start: 2025-07-20

## 2025-07-21 ENCOUNTER — APPOINTMENT (OUTPATIENT)
Dept: PRIMARY CARE | Facility: CLINIC | Age: 70
End: 2025-07-21
Payer: MEDICARE

## 2025-07-21 ENCOUNTER — TELEPHONE (OUTPATIENT)
Dept: CARDIOLOGY | Facility: CLINIC | Age: 70
End: 2025-07-21

## 2025-07-21 VITALS
SYSTOLIC BLOOD PRESSURE: 106 MMHG | OXYGEN SATURATION: 98 % | WEIGHT: 200.8 LBS | BODY MASS INDEX: 26.49 KG/M2 | DIASTOLIC BLOOD PRESSURE: 72 MMHG | HEART RATE: 53 BPM

## 2025-07-21 DIAGNOSIS — Z23 IMMUNIZATION DUE: ICD-10-CM

## 2025-07-21 DIAGNOSIS — D12.6 TUBULAR ADENOMA OF COLON: ICD-10-CM

## 2025-07-21 DIAGNOSIS — I48.19 ATRIAL FIBRILLATION, PERSISTENT (MULTI): Primary | ICD-10-CM

## 2025-07-21 DIAGNOSIS — H61.23 BILATERAL IMPACTED CERUMEN: ICD-10-CM

## 2025-07-21 DIAGNOSIS — R73.03 PREDIABETES: ICD-10-CM

## 2025-07-21 DIAGNOSIS — Z00.00 ROUTINE GENERAL MEDICAL EXAMINATION AT HEALTH CARE FACILITY: ICD-10-CM

## 2025-07-21 DIAGNOSIS — R93.1 AGATSTON CORONARY ARTERY CALCIUM SCORE BETWEEN 200 AND 399: ICD-10-CM

## 2025-07-21 DIAGNOSIS — E78.5 DYSLIPIDEMIA, GOAL LDL BELOW 70: ICD-10-CM

## 2025-07-21 DIAGNOSIS — I10 ESSENTIAL HYPERTENSION WITH GOAL BLOOD PRESSURE LESS THAN 130/85: ICD-10-CM

## 2025-07-21 DIAGNOSIS — R26.81 UNSTEADY GAIT: ICD-10-CM

## 2025-07-21 DIAGNOSIS — H61.23 BILATERAL HEARING LOSS DUE TO CERUMEN IMPACTION: ICD-10-CM

## 2025-07-21 PROCEDURE — G2211 COMPLEX E/M VISIT ADD ON: HCPCS | Performed by: INTERNAL MEDICINE

## 2025-07-21 PROCEDURE — 1159F MED LIST DOCD IN RCRD: CPT | Performed by: INTERNAL MEDICINE

## 2025-07-21 PROCEDURE — 1036F TOBACCO NON-USER: CPT | Performed by: INTERNAL MEDICINE

## 2025-07-21 PROCEDURE — 99214 OFFICE O/P EST MOD 30 MIN: CPT | Performed by: INTERNAL MEDICINE

## 2025-07-21 PROCEDURE — 3078F DIAST BP <80 MM HG: CPT | Performed by: INTERNAL MEDICINE

## 2025-07-21 PROCEDURE — 3074F SYST BP LT 130 MM HG: CPT | Performed by: INTERNAL MEDICINE

## 2025-07-21 PROCEDURE — 1160F RVW MEDS BY RX/DR IN RCRD: CPT | Performed by: INTERNAL MEDICINE

## 2025-07-21 ASSESSMENT — PATIENT HEALTH QUESTIONNAIRE - PHQ9
1. LITTLE INTEREST OR PLEASURE IN DOING THINGS: NOT AT ALL
2. FEELING DOWN, DEPRESSED OR HOPELESS: NOT AT ALL
SUM OF ALL RESPONSES TO PHQ9 QUESTIONS 1 AND 2: 0
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
## 2025-08-01 ENCOUNTER — TELEPHONE (OUTPATIENT)
Dept: CARDIOLOGY | Facility: HOSPITAL | Age: 70
End: 2025-08-01
Payer: MEDICARE

## 2025-08-04 ENCOUNTER — ANESTHESIA (OUTPATIENT)
Dept: CARDIOLOGY | Facility: HOSPITAL | Age: 70
End: 2025-08-04
Payer: MEDICARE

## 2025-08-04 ENCOUNTER — HOSPITAL ENCOUNTER (OUTPATIENT)
Dept: CARDIOLOGY | Facility: HOSPITAL | Age: 70
Discharge: HOME | End: 2025-08-04
Payer: MEDICARE

## 2025-08-04 ENCOUNTER — APPOINTMENT (OUTPATIENT)
Dept: CARDIOLOGY | Facility: HOSPITAL | Age: 70
End: 2025-08-04
Payer: MEDICARE

## 2025-08-04 ENCOUNTER — ANESTHESIA EVENT (OUTPATIENT)
Dept: CARDIOLOGY | Facility: HOSPITAL | Age: 70
End: 2025-08-04
Payer: MEDICARE

## 2025-08-04 VITALS
DIASTOLIC BLOOD PRESSURE: 97 MMHG | WEIGHT: 200 LBS | OXYGEN SATURATION: 97 % | HEIGHT: 73 IN | RESPIRATION RATE: 18 BRPM | BODY MASS INDEX: 26.51 KG/M2 | SYSTOLIC BLOOD PRESSURE: 151 MMHG | TEMPERATURE: 97.7 F | HEART RATE: 99 BPM

## 2025-08-04 DIAGNOSIS — I48.19 ATRIAL FIBRILLATION, PERSISTENT (MULTI): ICD-10-CM

## 2025-08-04 PROCEDURE — 93005 ELECTROCARDIOGRAM TRACING: CPT

## 2025-08-04 PROCEDURE — 3700000002 HC GENERAL ANESTHESIA TIME - EACH INCREMENTAL 1 MINUTE

## 2025-08-04 PROCEDURE — 92960 CARDIOVERSION ELECTRIC EXT: CPT | Performed by: INTERNAL MEDICINE

## 2025-08-04 PROCEDURE — 7100000010 HC PHASE TWO TIME - EACH INCREMENTAL 1 MINUTE

## 2025-08-04 PROCEDURE — 92960 CARDIOVERSION ELECTRIC EXT: CPT

## 2025-08-04 PROCEDURE — 2500000004 HC RX 250 GENERAL PHARMACY W/ HCPCS (ALT 636 FOR OP/ED): Performed by: NURSE ANESTHETIST, CERTIFIED REGISTERED

## 2025-08-04 PROCEDURE — 7100000009 HC PHASE TWO TIME - INITIAL BASE CHARGE

## 2025-08-04 PROCEDURE — A92960 PR CARDIOVERSION, ELECTIVE;EXTERN: Performed by: NURSE ANESTHETIST, CERTIFIED REGISTERED

## 2025-08-04 PROCEDURE — A92960 PR CARDIOVERSION, ELECTIVE;EXTERN: Performed by: STUDENT IN AN ORGANIZED HEALTH CARE EDUCATION/TRAINING PROGRAM

## 2025-08-04 PROCEDURE — 99100 ANES PT EXTEME AGE<1 YR&>70: CPT | Performed by: STUDENT IN AN ORGANIZED HEALTH CARE EDUCATION/TRAINING PROGRAM

## 2025-08-04 PROCEDURE — 3700000001 HC GENERAL ANESTHESIA TIME - INITIAL BASE CHARGE

## 2025-08-04 RX ORDER — PROPOFOL 10 MG/ML
INJECTION, EMULSION INTRAVENOUS AS NEEDED
Status: DISCONTINUED | OUTPATIENT
Start: 2025-08-04 | End: 2025-08-04

## 2025-08-04 RX ORDER — SODIUM CHLORIDE, SODIUM LACTATE, POTASSIUM CHLORIDE, CALCIUM CHLORIDE 600; 310; 30; 20 MG/100ML; MG/100ML; MG/100ML; MG/100ML
INJECTION, SOLUTION INTRAVENOUS CONTINUOUS PRN
Status: DISCONTINUED | OUTPATIENT
Start: 2025-08-04 | End: 2025-08-04

## 2025-08-04 RX ADMIN — PROPOFOL 50 MG: 10 INJECTION, EMULSION INTRAVENOUS at 12:47

## 2025-08-04 RX ADMIN — SODIUM CHLORIDE, SODIUM LACTATE, POTASSIUM CHLORIDE, AND CALCIUM CHLORIDE: .6; .31; .03; .02 INJECTION, SOLUTION INTRAVENOUS at 12:43

## 2025-08-04 RX ADMIN — PROPOFOL 30 MG: 10 INJECTION, EMULSION INTRAVENOUS at 12:48

## 2025-08-04 SDOH — HEALTH STABILITY: MENTAL HEALTH: CURRENT SMOKER: 0

## 2025-08-04 ASSESSMENT — PAIN - FUNCTIONAL ASSESSMENT: PAIN_FUNCTIONAL_ASSESSMENT: 0-10

## 2025-08-04 ASSESSMENT — PAIN SCALES - GENERAL
PAINLEVEL_OUTOF10: 0 - NO PAIN
PAIN_LEVEL: 0

## 2025-08-04 ASSESSMENT — COLUMBIA-SUICIDE SEVERITY RATING SCALE - C-SSRS
6. HAVE YOU EVER DONE ANYTHING, STARTED TO DO ANYTHING, OR PREPARED TO DO ANYTHING TO END YOUR LIFE?: NO
2. HAVE YOU ACTUALLY HAD ANY THOUGHTS OF KILLING YOURSELF?: NO
1. IN THE PAST MONTH, HAVE YOU WISHED YOU WERE DEAD OR WISHED YOU COULD GO TO SLEEP AND NOT WAKE UP?: NO

## 2025-08-04 NOTE — ANESTHESIA PREPROCEDURE EVALUATION
Patient: Ernesto Anaya    Procedure Information       Date/Time: 25 1230    Procedure: CARDIOVERSION EXTERNAL    Location: [facility]            Relevant Problems   Cardiac   (+) Atrial fibrillation, persistent (Multi)   (+) Dyslipidemia, goal LDL below 70   (+) Essential hypertension with goal blood pressure less than 130/85   (+) LAFB (left anterior fascicular block)   (+) Left axis deviation      Neuro   (+) Situational anxiety   (+) Situational depression      /Renal   (+) Benign enlargement of prostate   (+) Calcium oxalate stones      HEENT   (+) Asymmetrical sensorineural hearing loss       Clinical information reviewed:   Tobacco  Allergies  Meds   Med Hx  Surg Hx   Fam Hx          NPO Detail:  NPO/Void Status  Date of Last Liquid: 25  Time of Last Liquid: 09  Date of Last Solid: 25  Time of Last Solid: 2100         Physical Exam    Airway  Mallampati: II  TM distance: >3 FB  Mouth openin finger widths     Cardiovascular - normal exam  Rhythm: regular  Rate: normal     Dental - normal exam     Pulmonary - normal exam   Abdominal - normal examAbdomen: soft             Anesthesia Plan    History of general anesthesia?: yes  History of complications of general anesthesia?: no    ASA 3     MAC     The patient is not a current smoker.  Patient was previously instructed to abstain from smoking on day of procedure.  Patient did not smoke on day of procedure.  Education provided regarding risk of obstructive sleep apnea.  intravenous induction   Postoperative pain plan includes opioids.  Anesthetic plan and risks discussed with patient.  Use of blood products discussed with patient who.    Plan discussed with [facility].

## 2025-08-04 NOTE — ANESTHESIA POSTPROCEDURE EVALUATION
Patient: Ernesto Anaya    Procedure Summary       Date: 08/04/25 Room / Location: [facility]    Anesthesia Start: 1243 Anesthesia Stop: 1255    Procedure: CARDIOVERSION EXTERNAL Diagnosis: Atrial fibrillation, persistent (Multi)    Scheduled Providers:  Responsible Provider: Silvreio Rivas DO    Anesthesia Type: MAC ASA Status: 3            Anesthesia Type: MAC    Vitals Value Taken Time   /84 08/04/25 12:56   Temp 36.2 08/04/25 12:56   Pulse 54 08/04/25 12:56   Resp 14 08/04/25 12:56   SpO2 97% 08/04/25 12:56       Anesthesia Post Evaluation    Patient location during evaluation: bedside  Patient participation: complete - patient participated  Level of consciousness: awake and alert  Pain score: 0  Pain management: adequate  Airway patency: patent  Cardiovascular status: acceptable  Respiratory status: acceptable  Hydration status: acceptable  Postoperative Nausea and Vomiting: none        No notable events documented.

## 2025-08-04 NOTE — DISCHARGE INSTRUCTIONS
No Driving for 24 hours.   No alcohol for 24 hours.   Do not make any important decisions for 24 hours.

## 2025-08-04 NOTE — PROCEDURES
Indication: Atrial fibrillation    Procedure: Cardioversion    Anesthesia: see anesthesia documentation.    Procedure:    After adequate conscious sedation was achieved, we delivered one 200J biphasic synchronized shock successfully converting patient to NSR.    Complications: None    Vince Britton MD

## 2025-08-05 LAB
ATRIAL RATE: 59 BPM
BODY SURFACE AREA: 2.16 M2
P AXIS: 18 DEGREES
P OFFSET: 186 MS
P ONSET: 121 MS
PR INTERVAL: 182 MS
Q ONSET: 212 MS
QRS COUNT: 9 BEATS
QRS DURATION: 102 MS
QT INTERVAL: 412 MS
QTC CALCULATION(BAZETT): 407 MS
QTC FREDERICIA: 409 MS
R AXIS: -56 DEGREES
T AXIS: -24 DEGREES
T OFFSET: 418 MS
VENTRICULAR RATE: 59 BPM

## 2025-08-22 ENCOUNTER — OFFICE VISIT (OUTPATIENT)
Dept: CARDIOLOGY | Facility: CLINIC | Age: 70
End: 2025-08-22
Payer: MEDICARE

## 2025-08-22 VITALS
HEART RATE: 60 BPM | OXYGEN SATURATION: 97 % | DIASTOLIC BLOOD PRESSURE: 86 MMHG | BODY MASS INDEX: 26.51 KG/M2 | SYSTOLIC BLOOD PRESSURE: 142 MMHG | HEIGHT: 73 IN | WEIGHT: 200 LBS

## 2025-08-22 DIAGNOSIS — I25.10 CORONARY ARTERY CALCIFICATION: ICD-10-CM

## 2025-08-22 DIAGNOSIS — I34.0 NONRHEUMATIC MITRAL VALVE REGURGITATION: ICD-10-CM

## 2025-08-22 DIAGNOSIS — I48.19 ATRIAL FIBRILLATION, PERSISTENT (MULTI): Primary | ICD-10-CM

## 2025-08-22 LAB
ATRIAL RATE: 59 BPM
P AXIS: 69 DEGREES
P OFFSET: 174 MS
P ONSET: 124 MS
PR INTERVAL: 174 MS
Q ONSET: 211 MS
QRS COUNT: 9 BEATS
QRS DURATION: 100 MS
QT INTERVAL: 400 MS
QTC CALCULATION(BAZETT): 396 MS
QTC FREDERICIA: 398 MS
R AXIS: -58 DEGREES
T AXIS: -29 DEGREES
T OFFSET: 411 MS
VENTRICULAR RATE: 59 BPM

## 2025-08-22 PROCEDURE — 1036F TOBACCO NON-USER: CPT | Performed by: INTERNAL MEDICINE

## 2025-08-22 PROCEDURE — 99214 OFFICE O/P EST MOD 30 MIN: CPT | Performed by: INTERNAL MEDICINE

## 2025-08-22 PROCEDURE — 1159F MED LIST DOCD IN RCRD: CPT | Performed by: INTERNAL MEDICINE

## 2025-08-22 PROCEDURE — 93005 ELECTROCARDIOGRAM TRACING: CPT | Performed by: INTERNAL MEDICINE

## 2025-08-22 PROCEDURE — 3008F BODY MASS INDEX DOCD: CPT | Performed by: INTERNAL MEDICINE

## 2025-08-22 PROCEDURE — 3079F DIAST BP 80-89 MM HG: CPT | Performed by: INTERNAL MEDICINE

## 2025-08-22 PROCEDURE — 3077F SYST BP >= 140 MM HG: CPT | Performed by: INTERNAL MEDICINE

## 2025-08-22 PROCEDURE — 99212 OFFICE O/P EST SF 10 MIN: CPT

## 2025-08-29 ENCOUNTER — APPOINTMENT (OUTPATIENT)
Dept: CARDIOLOGY | Facility: CLINIC | Age: 70
End: 2025-08-29
Payer: MEDICARE

## 2025-08-31 LAB
ATRIAL RATE: 59 BPM
P AXIS: 18 DEGREES
P OFFSET: 186 MS
P ONSET: 121 MS
PR INTERVAL: 182 MS
Q ONSET: 212 MS
QRS COUNT: 9 BEATS
QRS DURATION: 102 MS
QT INTERVAL: 412 MS
QTC CALCULATION(BAZETT): 407 MS
QTC FREDERICIA: 409 MS
R AXIS: -56 DEGREES
T AXIS: -24 DEGREES
T OFFSET: 418 MS
VENTRICULAR RATE: 59 BPM

## 2025-12-18 ENCOUNTER — APPOINTMENT (OUTPATIENT)
Dept: PRIMARY CARE | Facility: CLINIC | Age: 70
End: 2025-12-18
Payer: MEDICARE